# Patient Record
Sex: FEMALE | Race: WHITE | NOT HISPANIC OR LATINO
[De-identification: names, ages, dates, MRNs, and addresses within clinical notes are randomized per-mention and may not be internally consistent; named-entity substitution may affect disease eponyms.]

---

## 2019-07-10 ENCOUNTER — RESULT REVIEW (OUTPATIENT)
Age: 69
End: 2019-07-10

## 2021-07-08 ENCOUNTER — APPOINTMENT (OUTPATIENT)
Dept: OPHTHALMOLOGY | Facility: CLINIC | Age: 71
End: 2021-07-08

## 2021-07-21 ENCOUNTER — APPOINTMENT (OUTPATIENT)
Dept: OPHTHALMOLOGY | Facility: CLINIC | Age: 71
End: 2021-07-21

## 2021-09-02 ENCOUNTER — APPOINTMENT (OUTPATIENT)
Dept: OPHTHALMOLOGY | Facility: CLINIC | Age: 71
End: 2021-09-02
Payer: MEDICARE

## 2021-09-02 ENCOUNTER — NON-APPOINTMENT (OUTPATIENT)
Age: 71
End: 2021-09-02

## 2021-09-02 PROCEDURE — 92134 CPTRZ OPH DX IMG PST SGM RTA: CPT

## 2021-09-02 PROCEDURE — 92014 COMPRE OPH EXAM EST PT 1/>: CPT

## 2021-09-02 PROCEDURE — 92136 OPHTHALMIC BIOMETRY: CPT

## 2021-09-13 ENCOUNTER — APPOINTMENT (OUTPATIENT)
Dept: OPHTHALMOLOGY | Facility: HOSPITAL | Age: 71
End: 2021-09-13
Payer: MEDICARE

## 2021-09-13 ENCOUNTER — NON-APPOINTMENT (OUTPATIENT)
Age: 71
End: 2021-09-13

## 2021-09-13 PROCEDURE — 66984 XCAPSL CTRC RMVL W/O ECP: CPT | Mod: LT

## 2021-09-14 ENCOUNTER — APPOINTMENT (OUTPATIENT)
Dept: OPHTHALMOLOGY | Facility: CLINIC | Age: 71
End: 2021-09-14
Payer: MEDICARE

## 2021-09-14 ENCOUNTER — NON-APPOINTMENT (OUTPATIENT)
Age: 71
End: 2021-09-14

## 2021-09-14 PROCEDURE — 99024 POSTOP FOLLOW-UP VISIT: CPT

## 2021-09-21 ENCOUNTER — APPOINTMENT (OUTPATIENT)
Dept: OPHTHALMOLOGY | Facility: CLINIC | Age: 71
End: 2021-09-21

## 2021-09-23 ENCOUNTER — APPOINTMENT (OUTPATIENT)
Dept: OPHTHALMOLOGY | Facility: CLINIC | Age: 71
End: 2021-09-23
Payer: MEDICARE

## 2021-09-23 ENCOUNTER — NON-APPOINTMENT (OUTPATIENT)
Age: 71
End: 2021-09-23

## 2021-09-23 PROCEDURE — 99024 POSTOP FOLLOW-UP VISIT: CPT

## 2021-10-01 ENCOUNTER — NON-APPOINTMENT (OUTPATIENT)
Age: 71
End: 2021-10-01

## 2021-10-01 ENCOUNTER — APPOINTMENT (OUTPATIENT)
Dept: OPHTHALMOLOGY | Facility: CLINIC | Age: 71
End: 2021-10-01
Payer: MEDICARE

## 2021-10-01 PROCEDURE — 92012 INTRM OPH EXAM EST PATIENT: CPT | Mod: 24

## 2021-10-04 ENCOUNTER — NON-APPOINTMENT (OUTPATIENT)
Age: 71
End: 2021-10-04

## 2021-10-04 ENCOUNTER — APPOINTMENT (OUTPATIENT)
Dept: OPHTHALMOLOGY | Facility: CLINIC | Age: 71
End: 2021-10-04
Payer: MEDICARE

## 2021-10-04 PROCEDURE — 99024 POSTOP FOLLOW-UP VISIT: CPT

## 2021-11-11 ENCOUNTER — APPOINTMENT (OUTPATIENT)
Dept: OPHTHALMOLOGY | Facility: CLINIC | Age: 71
End: 2021-11-11
Payer: MEDICARE

## 2021-11-11 ENCOUNTER — TRANSCRIPTION ENCOUNTER (OUTPATIENT)
Age: 71
End: 2021-11-11

## 2021-11-11 ENCOUNTER — NON-APPOINTMENT (OUTPATIENT)
Age: 71
End: 2021-11-11

## 2021-11-11 PROCEDURE — 99024 POSTOP FOLLOW-UP VISIT: CPT

## 2021-11-16 PROBLEM — Z00.00 ENCOUNTER FOR PREVENTIVE HEALTH EXAMINATION: Status: ACTIVE | Noted: 2021-11-16

## 2021-11-24 ENCOUNTER — NON-APPOINTMENT (OUTPATIENT)
Age: 71
End: 2021-11-24

## 2021-11-29 ENCOUNTER — RESULT REVIEW (OUTPATIENT)
Age: 71
End: 2021-11-29

## 2021-11-29 RX ORDER — CHOLECALCIFEROL (VITAMIN D3) 25 MCG
TABLET ORAL
Refills: 0 | Status: ACTIVE | COMMUNITY

## 2021-11-29 RX ORDER — AMLODIPINE BESYLATE 5 MG/1
5 TABLET ORAL
Refills: 0 | Status: ACTIVE | COMMUNITY

## 2021-11-29 RX ORDER — BUPROPION HYDROCHLORIDE 100 MG/1
TABLET, FILM COATED ORAL
Refills: 0 | Status: ACTIVE | COMMUNITY

## 2021-11-29 RX ORDER — CETIRIZINE HCL 10 MG
10 TABLET ORAL
Refills: 0 | Status: ACTIVE | COMMUNITY

## 2021-11-29 RX ORDER — ALPRAZOLAM 2 MG/1
TABLET ORAL
Refills: 0 | Status: ACTIVE | COMMUNITY

## 2021-11-29 RX ORDER — ASPIRIN 81 MG
81 TABLET, DELAYED RELEASE (ENTERIC COATED) ORAL
Refills: 0 | Status: ACTIVE | COMMUNITY

## 2021-11-29 RX ORDER — OMEPRAZOLE MAGNESIUM 20 MG/1
20 CAPSULE, DELAYED RELEASE ORAL
Refills: 0 | Status: ACTIVE | COMMUNITY

## 2021-12-01 ENCOUNTER — NON-APPOINTMENT (OUTPATIENT)
Age: 71
End: 2021-12-01

## 2021-12-01 ENCOUNTER — APPOINTMENT (OUTPATIENT)
Dept: THORACIC SURGERY | Facility: CLINIC | Age: 71
End: 2021-12-01
Payer: MEDICARE

## 2021-12-01 DIAGNOSIS — Z86.79 PERSONAL HISTORY OF OTHER DISEASES OF THE CIRCULATORY SYSTEM: ICD-10-CM

## 2021-12-01 DIAGNOSIS — Z82.49 FAMILY HISTORY OF ISCHEMIC HEART DISEASE AND OTHER DISEASES OF THE CIRCULATORY SYSTEM: ICD-10-CM

## 2021-12-01 DIAGNOSIS — Z80.0 FAMILY HISTORY OF MALIGNANT NEOPLASM OF DIGESTIVE ORGANS: ICD-10-CM

## 2021-12-01 DIAGNOSIS — Z87.891 PERSONAL HISTORY OF NICOTINE DEPENDENCE: ICD-10-CM

## 2021-12-01 PROCEDURE — 99205 OFFICE O/P NEW HI 60 MIN: CPT

## 2021-12-02 VITALS
SYSTOLIC BLOOD PRESSURE: 147 MMHG | RESPIRATION RATE: 16 BRPM | TEMPERATURE: 98.2 F | OXYGEN SATURATION: 97 % | DIASTOLIC BLOOD PRESSURE: 75 MMHG | HEART RATE: 63 BPM

## 2021-12-02 PROBLEM — Z86.79 HISTORY OF HYPERTENSION: Status: RESOLVED | Noted: 2021-12-02 | Resolved: 2021-12-02

## 2021-12-02 PROBLEM — Z87.891 FORMER SMOKER: Status: ACTIVE | Noted: 2021-12-02

## 2021-12-02 PROBLEM — Z80.0 FAMILY HISTORY OF MALIGNANT NEOPLASM OF COLON: Status: ACTIVE | Noted: 2021-12-02

## 2021-12-02 PROBLEM — Z82.49 FAMILY HISTORY OF HYPERTENSION: Status: ACTIVE | Noted: 2021-12-02

## 2021-12-03 NOTE — CONSULT LETTER
[Dear  ___] : Dear  [unfilled], [Consult Letter:] : I had the pleasure of evaluating your patient, [unfilled]. [( Thank you for referring [unfilled] for consultation for _____ )] : Thank you for referring [unfilled] for consultation for [unfilled] [Please see my note below.] : Please see my note below. [Consult Closing:] : Thank you very much for allowing me to participate in the care of this patient.  If you have any questions, please do not hesitate to contact me. [Sincerely,] : Sincerely, [FreeTextEntry2] : Dr. Addison (PULM/ref)\par Dr. Jeremy Canchola (ONC)\par Dr. Yadiel Washburn (GI)\par Dr. Abhinav Jennings (CARDS)\par  [FreeTextEntry3] : Deon Galan MD, FACS \par , Thoracic Surgery, [facility]\par Chief of Division of Thoracic Surgery, [facility]\par Department of Cardiovascular & Thoracic Surgery \par , [facility] [employer] [facility]\par

## 2021-12-03 NOTE — ASSESSMENT
[FreeTextEntry1] : Ms. BAILEY SUN, 71 year old female, former smoker (quit 2 wks ago, 40PY, 1ppd), w/ hx of HTN and colon cancer s/p resection and chemo 2000, who presented for routine yearly lung cancer screening, found to have enlarging RML pulmonary nodule. She underwent FNA biopsy on 11/29 which revealed adenocarcinoma, favor lung primary.  \par \par I have independently reviewed the patient's medical records and diagnostic images at time of this office consultation. I discussed the imaging and biopsy results with patient and her spouse Dr. Sun. She has biopsy proven Adenocarcinoma of likely lung primary. I have recommended she undergo Bronchoscopy Right VATS Robotic Assisted Right Middle Lobectomy with LN dissection, Cardiac clearance and PFT prior. Risks, benefits, and alternatives explained to patient, all questions answered, patient agreed to proceed with surgery. She will be out of town until 12/17 and will call to book her surgery. \par \par I personally performed the services described in the documentation, reviewed the documentation recorded by the scribe in my presence and it accurately and completely records my words and actions.\par \par GABBI, SAUNDRA Lozada, am scribing for and in the presence of WASHINGTON Anderson, the following sections HISTORY OF PRESENT ILLNESS, PAST MEDICAL/FAMILY/SOCIAL HISTORY; REVIEW OF SYSTEMS; VITAL SIGNS; PHYSICAL EXAM; DISPOSITION.\par

## 2021-12-03 NOTE — PHYSICAL EXAM
[Restricted in physically strenuous activity but ambulatory and able to carry out work of a light or sedentary nature] : Status 1- Restricted in physically strenuous activity but ambulatory and able to carry out work of a light or sedentary nature, e.g., light house work, office work [General Appearance - Alert] : alert [General Appearance - In No Acute Distress] : in no acute distress [PERRL With Normal Accommodation] : pupils were equal in size, round, and reactive to light [Outer Ear] : the ears and nose were normal in appearance [Neck Appearance] : the appearance of the neck was normal [] : the neck was supple [Respiration, Rhythm And Depth] : normal respiratory rhythm and effort [Heart Sounds] : normal S1 and S2 [Examination Of The Chest] : the chest was normal in appearance [2+] : left 2+ [Breast Appearance] : normal in appearance [Abdomen Soft] : soft [Cervical Lymph Nodes Enlarged Posterior Bilaterally] : posterior cervical [Cervical Lymph Nodes Enlarged Anterior Bilaterally] : anterior cervical [Supraclavicular Lymph Nodes Enlarged Bilaterally] : supraclavicular [No CVA Tenderness] : no ~M costovertebral angle tenderness [Abnormal Walk] : normal gait [Skin Turgor] : normal skin turgor [No Focal Deficits] : no focal deficits [Oriented To Time, Place, And Person] : oriented to person, place, and time [FreeTextEntry1] : Deferred

## 2021-12-03 NOTE — HISTORY OF PRESENT ILLNESS
[FreeTextEntry1] : Ms. BAILEY SUN, 71 year old female, former smoker (quit 2 wks ago, 40PY, 1ppd), w/ hx of HTN and colon cancer s/p resection and chemo 2000, who presented for routine yearly lung cancer screening, found to have enlarging RML pulmonary nodule. She underwent FNA biopsy on 11/29 which revealed adenocarcinoma, favor lung primary.  \par \par CT Chest on 11/14/21:\par -  Enlarging 0.8 x 0.7 cm solid nodule noted in the medial segment of RML, previously 0.7 x 0.4 cm RML\par \par PET/CT on 11/18/21:\par - 1.2 cm spiculated RML nodule, SUV 2.2\par - Moderate focal hypermetabolism, SUV 4 corresponding to intraatrial septum, most c/w lipomatous hypertrophy septum. \par \par Patient is here today for CT Sx consultation, referred by Dr. Addison (PULM).

## 2021-12-28 DIAGNOSIS — Z01.818 ENCOUNTER FOR OTHER PREPROCEDURAL EXAMINATION: ICD-10-CM

## 2022-01-10 ENCOUNTER — OUTPATIENT (OUTPATIENT)
Dept: OUTPATIENT SERVICES | Facility: HOSPITAL | Age: 72
LOS: 1 days | End: 2022-01-10
Payer: MEDICARE

## 2022-01-10 VITALS
RESPIRATION RATE: 16 BRPM | OXYGEN SATURATION: 100 % | TEMPERATURE: 98 F | WEIGHT: 138.89 LBS | DIASTOLIC BLOOD PRESSURE: 54 MMHG | HEART RATE: 58 BPM | HEIGHT: 62 IN | SYSTOLIC BLOOD PRESSURE: 105 MMHG

## 2022-01-10 DIAGNOSIS — Z96.659 PRESENCE OF UNSPECIFIED ARTIFICIAL KNEE JOINT: Chronic | ICD-10-CM

## 2022-01-10 DIAGNOSIS — R91.1 SOLITARY PULMONARY NODULE: ICD-10-CM

## 2022-01-10 DIAGNOSIS — Z90.49 ACQUIRED ABSENCE OF OTHER SPECIFIED PARTS OF DIGESTIVE TRACT: Chronic | ICD-10-CM

## 2022-01-10 DIAGNOSIS — Z29.9 ENCOUNTER FOR PROPHYLACTIC MEASURES, UNSPECIFIED: ICD-10-CM

## 2022-01-10 DIAGNOSIS — Z98.890 OTHER SPECIFIED POSTPROCEDURAL STATES: Chronic | ICD-10-CM

## 2022-01-10 DIAGNOSIS — Z87.39 PERSONAL HISTORY OF OTHER DISEASES OF THE MUSCULOSKELETAL SYSTEM AND CONNECTIVE TISSUE: Chronic | ICD-10-CM

## 2022-01-10 DIAGNOSIS — Z01.818 ENCOUNTER FOR OTHER PREPROCEDURAL EXAMINATION: ICD-10-CM

## 2022-01-10 LAB
ANION GAP SERPL CALC-SCNC: 3 MMOL/L — LOW (ref 5–17)
APTT BLD: 32.2 SEC — SIGNIFICANT CHANGE UP (ref 27.5–35.5)
BASOPHILS # BLD AUTO: 0.06 K/UL — SIGNIFICANT CHANGE UP (ref 0–0.2)
BASOPHILS NFR BLD AUTO: 0.8 % — SIGNIFICANT CHANGE UP (ref 0–2)
BUN SERPL-MCNC: 14 MG/DL — SIGNIFICANT CHANGE UP (ref 7–23)
CALCIUM SERPL-MCNC: 9.2 MG/DL — SIGNIFICANT CHANGE UP (ref 8.5–10.1)
CHLORIDE SERPL-SCNC: 106 MMOL/L — SIGNIFICANT CHANGE UP (ref 96–108)
CO2 SERPL-SCNC: 29 MMOL/L — SIGNIFICANT CHANGE UP (ref 22–31)
CREAT SERPL-MCNC: 0.52 MG/DL — SIGNIFICANT CHANGE UP (ref 0.5–1.3)
EOSINOPHIL # BLD AUTO: 0.13 K/UL — SIGNIFICANT CHANGE UP (ref 0–0.5)
EOSINOPHIL NFR BLD AUTO: 1.6 % — SIGNIFICANT CHANGE UP (ref 0–6)
GLUCOSE SERPL-MCNC: 101 MG/DL — HIGH (ref 70–99)
HCT VFR BLD CALC: 36.9 % — SIGNIFICANT CHANGE UP (ref 34.5–45)
HGB BLD-MCNC: 12.3 G/DL — SIGNIFICANT CHANGE UP (ref 11.5–15.5)
IMM GRANULOCYTES NFR BLD AUTO: 0.4 % — SIGNIFICANT CHANGE UP (ref 0–1.5)
INR BLD: 1 RATIO — SIGNIFICANT CHANGE UP (ref 0.88–1.16)
LYMPHOCYTES # BLD AUTO: 2.36 K/UL — SIGNIFICANT CHANGE UP (ref 1–3.3)
LYMPHOCYTES # BLD AUTO: 29.7 % — SIGNIFICANT CHANGE UP (ref 13–44)
MCHC RBC-ENTMCNC: 29.6 PG — SIGNIFICANT CHANGE UP (ref 27–34)
MCHC RBC-ENTMCNC: 33.3 GM/DL — SIGNIFICANT CHANGE UP (ref 32–36)
MCV RBC AUTO: 88.9 FL — SIGNIFICANT CHANGE UP (ref 80–100)
MONOCYTES # BLD AUTO: 0.51 K/UL — SIGNIFICANT CHANGE UP (ref 0–0.9)
MONOCYTES NFR BLD AUTO: 6.4 % — SIGNIFICANT CHANGE UP (ref 2–14)
NEUTROPHILS # BLD AUTO: 4.86 K/UL — SIGNIFICANT CHANGE UP (ref 1.8–7.4)
NEUTROPHILS NFR BLD AUTO: 61.1 % — SIGNIFICANT CHANGE UP (ref 43–77)
PLATELET # BLD AUTO: 227 K/UL — SIGNIFICANT CHANGE UP (ref 150–400)
POTASSIUM SERPL-MCNC: 4 MMOL/L — SIGNIFICANT CHANGE UP (ref 3.5–5.3)
POTASSIUM SERPL-SCNC: 4 MMOL/L — SIGNIFICANT CHANGE UP (ref 3.5–5.3)
PROTHROM AB SERPL-ACNC: 11.6 SEC — SIGNIFICANT CHANGE UP (ref 10.6–13.6)
RBC # BLD: 4.15 M/UL — SIGNIFICANT CHANGE UP (ref 3.8–5.2)
RBC # FLD: 13.6 % — SIGNIFICANT CHANGE UP (ref 10.3–14.5)
SODIUM SERPL-SCNC: 138 MMOL/L — SIGNIFICANT CHANGE UP (ref 135–145)
WBC # BLD: 7.95 K/UL — SIGNIFICANT CHANGE UP (ref 3.8–10.5)
WBC # FLD AUTO: 7.95 K/UL — SIGNIFICANT CHANGE UP (ref 3.8–10.5)

## 2022-01-10 PROCEDURE — 86901 BLOOD TYPING SEROLOGIC RH(D): CPT

## 2022-01-10 PROCEDURE — G0463: CPT | Mod: 25

## 2022-01-10 PROCEDURE — 86920 COMPATIBILITY TEST SPIN: CPT

## 2022-01-10 PROCEDURE — 93010 ELECTROCARDIOGRAM REPORT: CPT

## 2022-01-10 PROCEDURE — 93005 ELECTROCARDIOGRAM TRACING: CPT

## 2022-01-10 PROCEDURE — 86900 BLOOD TYPING SEROLOGIC ABO: CPT

## 2022-01-10 PROCEDURE — 80048 BASIC METABOLIC PNL TOTAL CA: CPT

## 2022-01-10 PROCEDURE — 85025 COMPLETE CBC W/AUTO DIFF WBC: CPT

## 2022-01-10 PROCEDURE — 86850 RBC ANTIBODY SCREEN: CPT

## 2022-01-10 PROCEDURE — 85610 PROTHROMBIN TIME: CPT

## 2022-01-10 PROCEDURE — 85730 THROMBOPLASTIN TIME PARTIAL: CPT

## 2022-01-10 PROCEDURE — 36415 COLL VENOUS BLD VENIPUNCTURE: CPT

## 2022-01-10 NOTE — H&P PST ADULT - NSICDXPASTSURGICALHX_GEN_ALL_CORE_FT
PAST SURGICAL HISTORY:  H/O colonoscopy 2019    H/O total knee replacement Bilateral-- 1/2016 & 5/2016    History of colon resection 6/2000- colon cancer, removed 18 inches, chemo. 19 treatments    History of lumbar laminectomy Lumbar Lami. , fusion L4-L5--2014    History of trigger finger Bilateral--1990's

## 2022-01-10 NOTE — H&P PST ADULT - ASSESSMENT
71 y.o female scheduled for  71 y.o female scheduled for Bronchoscopy, Right Video Assisted Thoracic Surgery, Robotic-Assisted, Right Middle Lobectomy with Lymph Node Dissection   Plan  1. Stop all NSAIDS, herbal supplements and vitamins for 7 days.  2. NPO at midnight.  3. Take the following medications--Wellbutrin-- with small sips of water on the morning of your procedure/surgery.  4. Use EZ sponges as directed  5. Labs, EKG as per surgeon  6. PMD STEFANIE Addison & GRISELDA Jennings cardiology visit for optimization prior to surgery as per surgeon  7. COVID swab appt: 1/15/2022    CAPRINI SCORE    AGE RELATED RISK FACTORS                                                       MOBILITY RELATED FACTORS  [ ] Age 41-60 years                                            (1 Point)                  [ ] Bed rest                                                        (1 Point)  [x ] Age: 61-74 years                                           (2 Points)                [ ] Plaster cast                                                   (2 Points)  [ ] Age= 75 years                                              (3 Points)                 [ ] Bed bound for more than 72 hours                   (2 Points)    DISEASE RELATED RISK FACTORS                                               GENDER SPECIFIC FACTORS  [ ] Edema in the lower extremities                       (1 Point)                  [ ] Pregnancy                                                     (1 Point)  [ ] Varicose veins                                               (1 Point)                  [ ] Post-partum < 6 weeks                                   (1 Point)             [x ] BMI > 25 Kg/m2                                            (1 Point)                  [ ] Hormonal therapy  or oral contraception            (1 Point)                 [ ] Sepsis (in the previous month)                        (1 Point)                  [ ] History of pregnancy complications  [ ] Pneumonia or serious lung disease                                               [ ] Unexplained or recurrent                       (1 Point)           (in the previous month)                               (1 Point)  [ ] Abnormal pulmonary function test                     (1 Point)                 SURGERY RELATED RISK FACTORS  [ ] Acute myocardial infarction                              (1 Point)                 [ ]  Section                                            (1 Point)  [ ] Congestive heart failure (in the previous month)  (1 Point)                 [ ] Minor surgery                                                 (1 Point)   [ ] Inflammatory bowel disease                             (1 Point)                 [ ] Arthroscopic surgery                                        (2 Points)  [ ] Central venous access                                    (2 Points)                [x ] General surgery lasting more than 45 minutes   (2 Points)       [ ] Stroke (in the previous month)                          (5 Points)               [ ] Elective arthroplasty                                        (5 Points)                                                                                                                                               HEMATOLOGY RELATED FACTORS                                                 TRAUMA RELATED RISK FACTORS  [ ] Prior episodes of VTE                                     (3 Points)                 [ ] Fracture of the hip, pelvis, or leg                       (5 Points)  [ ] Positive family history for VTE                         (3 Points)                 [ ] Acute spinal cord injury (in the previous month)  (5 Points)  [ ] Prothrombin 82630 A                                      (3 Points)                 [ ] Paralysis  (less than 1 month)                          (5 Points)  [ ] Factor V Leiden                                             (3 Points)                 [ ] Multiple Trauma within 1 month                         (5 Points)  [ ] Lupus anticoagulants                                     (3 Points)                                                           [ ] Anticardiolipin antibodies                                (3 Points)                                                       [ ] High homocysteine in the blood                      (3 Points)                                             [ ] Other congenital or acquired thrombophilia       (3 Points)                                                [ ] Heparin induced thrombocytopenia                  (3 Points)                                          Total Score [       5  ]      The Caprini score indicates this patient is at risk for a VTE event (score 3-5).  Most surgical patients in this group would benefit from pharmacologic prophylaxis.  The surgical team will determine the balance between VTE risk and bleeding risk

## 2022-01-10 NOTE — H&P PST ADULT - HISTORY OF PRESENT ILLNESS
71 y.o  71 y.o WD, WN pleasant female presents to PST with hx of right middle lobe mass. Patient states she was a participant in a lung cancer screening program for years. She was a smoker for 50+ years. She reports last screening which involves a CT scan revealed a right middle lobe lung mass. Patient was referred to surgeon and states further diagnostics and biopsy revealed positive cancer. Her hx is significant for colon cancer 20 yrs ago. Patient now scheduled for Bronchoscopy, Right Video Assisted Thoracic Surgery, Robotic-Assisted, Right Middle Lobectomy with Lymph Node Dissection

## 2022-01-11 DIAGNOSIS — Z01.818 ENCOUNTER FOR OTHER PREPROCEDURAL EXAMINATION: ICD-10-CM

## 2022-01-11 DIAGNOSIS — R91.1 SOLITARY PULMONARY NODULE: ICD-10-CM

## 2022-01-14 RX ORDER — HYDROMORPHONE HYDROCHLORIDE 2 MG/ML
0.5 INJECTION INTRAMUSCULAR; INTRAVENOUS; SUBCUTANEOUS
Refills: 0 | Status: DISCONTINUED | OUTPATIENT
Start: 2022-01-18 | End: 2022-01-18

## 2022-01-14 RX ORDER — SODIUM CHLORIDE 9 MG/ML
1000 INJECTION, SOLUTION INTRAVENOUS
Refills: 0 | Status: DISCONTINUED | OUTPATIENT
Start: 2022-01-18 | End: 2022-01-18

## 2022-01-14 RX ORDER — ONDANSETRON 8 MG/1
4 TABLET, FILM COATED ORAL ONCE
Refills: 0 | Status: DISCONTINUED | OUTPATIENT
Start: 2022-01-18 | End: 2022-01-18

## 2022-01-16 LAB — SARS-COV-2 N GENE NPH QL NAA+PROBE: NOT DETECTED

## 2022-01-18 ENCOUNTER — RESULT REVIEW (OUTPATIENT)
Age: 72
End: 2022-01-18

## 2022-01-18 ENCOUNTER — INPATIENT (INPATIENT)
Facility: HOSPITAL | Age: 72
LOS: 0 days | Discharge: HOME | DRG: 168 | End: 2022-01-19
Attending: THORACIC SURGERY (CARDIOTHORACIC VASCULAR SURGERY) | Admitting: THORACIC SURGERY (CARDIOTHORACIC VASCULAR SURGERY)
Payer: MEDICARE

## 2022-01-18 ENCOUNTER — APPOINTMENT (OUTPATIENT)
Dept: THORACIC SURGERY | Facility: HOSPITAL | Age: 72
End: 2022-01-18

## 2022-01-18 VITALS
DIASTOLIC BLOOD PRESSURE: 64 MMHG | TEMPERATURE: 98 F | HEART RATE: 62 BPM | SYSTOLIC BLOOD PRESSURE: 140 MMHG | WEIGHT: 138.01 LBS | HEIGHT: 62 IN | OXYGEN SATURATION: 100 % | RESPIRATION RATE: 15 BRPM

## 2022-01-18 DIAGNOSIS — C34.2 MALIGNANT NEOPLASM OF MIDDLE LOBE, BRONCHUS OR LUNG: ICD-10-CM

## 2022-01-18 DIAGNOSIS — Z96.659 PRESENCE OF UNSPECIFIED ARTIFICIAL KNEE JOINT: Chronic | ICD-10-CM

## 2022-01-18 DIAGNOSIS — R91.1 SOLITARY PULMONARY NODULE: ICD-10-CM

## 2022-01-18 DIAGNOSIS — E78.5 HYPERLIPIDEMIA, UNSPECIFIED: ICD-10-CM

## 2022-01-18 DIAGNOSIS — F32.9 MAJOR DEPRESSIVE DISORDER, SINGLE EPISODE, UNSPECIFIED: ICD-10-CM

## 2022-01-18 DIAGNOSIS — J44.9 CHRONIC OBSTRUCTIVE PULMONARY DISEASE, UNSPECIFIED: ICD-10-CM

## 2022-01-18 DIAGNOSIS — Z98.890 OTHER SPECIFIED POSTPROCEDURAL STATES: Chronic | ICD-10-CM

## 2022-01-18 DIAGNOSIS — Z87.39 PERSONAL HISTORY OF OTHER DISEASES OF THE MUSCULOSKELETAL SYSTEM AND CONNECTIVE TISSUE: Chronic | ICD-10-CM

## 2022-01-18 DIAGNOSIS — Z90.49 ACQUIRED ABSENCE OF OTHER SPECIFIED PARTS OF DIGESTIVE TRACT: Chronic | ICD-10-CM

## 2022-01-18 DIAGNOSIS — F41.9 ANXIETY DISORDER, UNSPECIFIED: ICD-10-CM

## 2022-01-18 LAB
ANION GAP SERPL CALC-SCNC: 3 MMOL/L — LOW (ref 5–17)
BUN SERPL-MCNC: 17 MG/DL — SIGNIFICANT CHANGE UP (ref 7–23)
CALCIUM SERPL-MCNC: 8.9 MG/DL — SIGNIFICANT CHANGE UP (ref 8.5–10.1)
CHLORIDE SERPL-SCNC: 110 MMOL/L — HIGH (ref 96–108)
CO2 SERPL-SCNC: 29 MMOL/L — SIGNIFICANT CHANGE UP (ref 22–31)
CREAT SERPL-MCNC: 0.67 MG/DL — SIGNIFICANT CHANGE UP (ref 0.5–1.3)
GLUCOSE SERPL-MCNC: 136 MG/DL — HIGH (ref 70–99)
HCT VFR BLD CALC: 35.9 % — SIGNIFICANT CHANGE UP (ref 34.5–45)
HGB BLD-MCNC: 11.5 G/DL — SIGNIFICANT CHANGE UP (ref 11.5–15.5)
MAGNESIUM SERPL-MCNC: 2.2 MG/DL — SIGNIFICANT CHANGE UP (ref 1.6–2.6)
MCHC RBC-ENTMCNC: 29.9 PG — SIGNIFICANT CHANGE UP (ref 27–34)
MCHC RBC-ENTMCNC: 32 GM/DL — SIGNIFICANT CHANGE UP (ref 32–36)
MCV RBC AUTO: 93.5 FL — SIGNIFICANT CHANGE UP (ref 80–100)
PHOSPHATE SERPL-MCNC: 4.8 MG/DL — HIGH (ref 2.5–4.5)
PLATELET # BLD AUTO: 225 K/UL — SIGNIFICANT CHANGE UP (ref 150–400)
POTASSIUM SERPL-MCNC: 4.3 MMOL/L — SIGNIFICANT CHANGE UP (ref 3.5–5.3)
POTASSIUM SERPL-SCNC: 4.3 MMOL/L — SIGNIFICANT CHANGE UP (ref 3.5–5.3)
RBC # BLD: 3.84 M/UL — SIGNIFICANT CHANGE UP (ref 3.8–5.2)
RBC # FLD: 14.3 % — SIGNIFICANT CHANGE UP (ref 10.3–14.5)
SODIUM SERPL-SCNC: 142 MMOL/L — SIGNIFICANT CHANGE UP (ref 135–145)
WBC # BLD: 16.9 K/UL — HIGH (ref 3.8–10.5)
WBC # FLD AUTO: 16.9 K/UL — HIGH (ref 3.8–10.5)

## 2022-01-18 PROCEDURE — 88341 IMHCHEM/IMCYTCHM EA ADD ANTB: CPT

## 2022-01-18 PROCEDURE — 84100 ASSAY OF PHOSPHORUS: CPT

## 2022-01-18 PROCEDURE — 88309 TISSUE EXAM BY PATHOLOGIST: CPT

## 2022-01-18 PROCEDURE — C9399: CPT

## 2022-01-18 PROCEDURE — 80048 BASIC METABOLIC PNL TOTAL CA: CPT

## 2022-01-18 PROCEDURE — 94640 AIRWAY INHALATION TREATMENT: CPT

## 2022-01-18 PROCEDURE — 85027 COMPLETE CBC AUTOMATED: CPT

## 2022-01-18 PROCEDURE — 88305 TISSUE EXAM BY PATHOLOGIST: CPT

## 2022-01-18 PROCEDURE — 36415 COLL VENOUS BLD VENIPUNCTURE: CPT

## 2022-01-18 PROCEDURE — 83735 ASSAY OF MAGNESIUM: CPT

## 2022-01-18 PROCEDURE — 85025 COMPLETE CBC W/AUTO DIFF WBC: CPT

## 2022-01-18 PROCEDURE — 71045 X-RAY EXAM CHEST 1 VIEW: CPT

## 2022-01-18 PROCEDURE — 97161 PT EVAL LOW COMPLEX 20 MIN: CPT | Mod: GP

## 2022-01-18 PROCEDURE — 97116 GAIT TRAINING THERAPY: CPT | Mod: GP

## 2022-01-18 PROCEDURE — 88342 IMHCHEM/IMCYTCHM 1ST ANTB: CPT

## 2022-01-18 PROCEDURE — C1889: CPT

## 2022-01-18 RX ORDER — HYDROMORPHONE HYDROCHLORIDE 2 MG/ML
30 INJECTION INTRAMUSCULAR; INTRAVENOUS; SUBCUTANEOUS
Refills: 0 | Status: DISCONTINUED | OUTPATIENT
Start: 2022-01-18 | End: 2022-01-19

## 2022-01-18 RX ORDER — ONDANSETRON 8 MG/1
4 TABLET, FILM COATED ORAL EVERY 6 HOURS
Refills: 0 | Status: DISCONTINUED | OUTPATIENT
Start: 2022-01-18 | End: 2022-01-19

## 2022-01-18 RX ORDER — CETIRIZINE HYDROCHLORIDE 10 MG/1
1 TABLET ORAL
Qty: 0 | Refills: 0 | DISCHARGE

## 2022-01-18 RX ORDER — GABAPENTIN 400 MG/1
400 CAPSULE ORAL AT BEDTIME
Refills: 0 | Status: DISCONTINUED | OUTPATIENT
Start: 2022-01-18 | End: 2022-01-19

## 2022-01-18 RX ORDER — CETIRIZINE HYDROCHLORIDE 10 MG/1
10 TABLET ORAL DAILY
Refills: 0 | Status: DISCONTINUED | OUTPATIENT
Start: 2022-01-18 | End: 2022-01-19

## 2022-01-18 RX ORDER — ALBUTEROL 90 UG/1
2.5 AEROSOL, METERED ORAL EVERY 6 HOURS
Refills: 0 | Status: DISCONTINUED | OUTPATIENT
Start: 2022-01-18 | End: 2022-01-18

## 2022-01-18 RX ORDER — NALOXONE HYDROCHLORIDE 4 MG/.1ML
0.1 SPRAY NASAL
Refills: 0 | Status: DISCONTINUED | OUTPATIENT
Start: 2022-01-18 | End: 2022-01-19

## 2022-01-18 RX ORDER — ACETAMINOPHEN 500 MG
1000 TABLET ORAL ONCE
Refills: 0 | Status: COMPLETED | OUTPATIENT
Start: 2022-01-19 | End: 2022-01-19

## 2022-01-18 RX ORDER — HEPARIN SODIUM 5000 [USP'U]/ML
5000 INJECTION INTRAVENOUS; SUBCUTANEOUS EVERY 8 HOURS
Refills: 0 | Status: DISCONTINUED | OUTPATIENT
Start: 2022-01-18 | End: 2022-01-19

## 2022-01-18 RX ORDER — BUPROPION HYDROCHLORIDE 150 MG/1
1 TABLET, EXTENDED RELEASE ORAL
Qty: 0 | Refills: 0 | DISCHARGE

## 2022-01-18 RX ORDER — AMLODIPINE BESYLATE 2.5 MG/1
5 TABLET ORAL DAILY
Refills: 0 | Status: DISCONTINUED | OUTPATIENT
Start: 2022-01-18 | End: 2022-01-19

## 2022-01-18 RX ORDER — OMEPRAZOLE 10 MG/1
1 CAPSULE, DELAYED RELEASE ORAL
Qty: 0 | Refills: 0 | DISCHARGE

## 2022-01-18 RX ORDER — ACETAMINOPHEN 500 MG
1000 TABLET ORAL ONCE
Refills: 0 | Status: COMPLETED | OUTPATIENT
Start: 2022-01-18 | End: 2022-01-18

## 2022-01-18 RX ORDER — IPRATROPIUM BROMIDE 0.2 MG/ML
500 SOLUTION, NON-ORAL INHALATION EVERY 6 HOURS
Refills: 0 | Status: DISCONTINUED | OUTPATIENT
Start: 2022-01-18 | End: 2022-01-18

## 2022-01-18 RX ORDER — BUPROPION HYDROCHLORIDE 150 MG/1
150 TABLET, EXTENDED RELEASE ORAL AT BEDTIME
Refills: 0 | Status: DISCONTINUED | OUTPATIENT
Start: 2022-01-18 | End: 2022-01-19

## 2022-01-18 RX ORDER — ACETAMINOPHEN 500 MG
1000 TABLET ORAL EVERY 8 HOURS
Refills: 0 | Status: DISCONTINUED | OUTPATIENT
Start: 2022-01-19 | End: 2022-01-19

## 2022-01-18 RX ORDER — POLYETHYLENE GLYCOL 3350 17 G/17G
17 POWDER, FOR SOLUTION ORAL DAILY
Refills: 0 | Status: DISCONTINUED | OUTPATIENT
Start: 2022-01-18 | End: 2022-01-19

## 2022-01-18 RX ORDER — SENNA PLUS 8.6 MG/1
2 TABLET ORAL AT BEDTIME
Refills: 0 | Status: DISCONTINUED | OUTPATIENT
Start: 2022-01-18 | End: 2022-01-19

## 2022-01-18 RX ORDER — PANTOPRAZOLE SODIUM 20 MG/1
40 TABLET, DELAYED RELEASE ORAL
Refills: 0 | Status: DISCONTINUED | OUTPATIENT
Start: 2022-01-18 | End: 2022-01-19

## 2022-01-18 RX ORDER — ALPRAZOLAM 0.25 MG
0 TABLET ORAL
Qty: 0 | Refills: 0 | DISCHARGE

## 2022-01-18 RX ORDER — HYDROMORPHONE HYDROCHLORIDE 2 MG/ML
0.5 INJECTION INTRAMUSCULAR; INTRAVENOUS; SUBCUTANEOUS
Refills: 0 | Status: DISCONTINUED | OUTPATIENT
Start: 2022-01-18 | End: 2022-01-19

## 2022-01-18 RX ORDER — BUDESONIDE AND FORMOTEROL FUMARATE DIHYDRATE 160; 4.5 UG/1; UG/1
2 AEROSOL RESPIRATORY (INHALATION) EVERY 24 HOURS
Refills: 0 | Status: DISCONTINUED | OUTPATIENT
Start: 2022-01-18 | End: 2022-01-19

## 2022-01-18 RX ORDER — GABAPENTIN 400 MG/1
0 CAPSULE ORAL
Qty: 0 | Refills: 0 | DISCHARGE

## 2022-01-18 RX ORDER — SODIUM CHLORIDE 9 MG/ML
1000 INJECTION, SOLUTION INTRAVENOUS
Refills: 0 | Status: DISCONTINUED | OUTPATIENT
Start: 2022-01-18 | End: 2022-01-19

## 2022-01-18 RX ORDER — FLUTICASONE PROPIONATE AND SALMETEROL 50; 250 UG/1; UG/1
1 POWDER ORAL; RESPIRATORY (INHALATION)
Qty: 0 | Refills: 0 | DISCHARGE

## 2022-01-18 RX ORDER — AMLODIPINE BESYLATE 2.5 MG/1
1 TABLET ORAL
Qty: 0 | Refills: 0 | DISCHARGE

## 2022-01-18 RX ADMIN — SODIUM CHLORIDE 25 MILLILITER(S): 9 INJECTION, SOLUTION INTRAVENOUS at 12:59

## 2022-01-18 RX ADMIN — Medication 400 MILLIGRAM(S): at 17:23

## 2022-01-18 RX ADMIN — Medication 1000 MILLIGRAM(S): at 17:41

## 2022-01-18 RX ADMIN — HEPARIN SODIUM 5000 UNIT(S): 5000 INJECTION INTRAVENOUS; SUBCUTANEOUS at 21:09

## 2022-01-18 RX ADMIN — SENNA PLUS 2 TABLET(S): 8.6 TABLET ORAL at 21:09

## 2022-01-18 RX ADMIN — GABAPENTIN 400 MILLIGRAM(S): 400 CAPSULE ORAL at 21:09

## 2022-01-18 RX ADMIN — HYDROMORPHONE HYDROCHLORIDE 30 MILLILITER(S): 2 INJECTION INTRAMUSCULAR; INTRAVENOUS; SUBCUTANEOUS at 12:32
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
## 2022-01-18 NOTE — PATIENT PROFILE ADULT - FALL HARM RISK - UNIVERSAL INTERVENTIONS
Bed in lowest position, wheels locked, appropriate side rails in place/Call bell, personal items and telephone in reach/Instruct patient to call for assistance before getting out of bed or chair/Non-slip footwear when patient is out of bed/[place] to call system/Physically safe environment - no spills, clutter or unnecessary equipment/Purposeful Proactive Rounding/Room/bathroom lighting operational, light cord in reach

## 2022-01-18 NOTE — BRIEF OPERATIVE NOTE - NSICDXBRIEFPROCEDURE_GEN_ALL_CORE_FT
PROCEDURES:  Robot-assisted thoracoscopy 18-Jan-2022 13:04:04 Right rootic VATS w/ RMLobectomy, MLND Shemar Thomas  Robotic assisted procedure, thoracoscopic 18-Jan-2022 13:04:24  Shemar Thomas

## 2022-01-19 ENCOUNTER — TRANSCRIPTION ENCOUNTER (OUTPATIENT)
Age: 72
End: 2022-01-19

## 2022-01-19 VITALS — TEMPERATURE: 98 F

## 2022-01-19 LAB
ANION GAP SERPL CALC-SCNC: 6 MMOL/L — SIGNIFICANT CHANGE UP (ref 5–17)
BASOPHILS # BLD AUTO: 0.02 K/UL — SIGNIFICANT CHANGE UP (ref 0–0.2)
BASOPHILS NFR BLD AUTO: 0.1 % — SIGNIFICANT CHANGE UP (ref 0–2)
BUN SERPL-MCNC: 15 MG/DL — SIGNIFICANT CHANGE UP (ref 7–23)
CALCIUM SERPL-MCNC: 8.8 MG/DL — SIGNIFICANT CHANGE UP (ref 8.5–10.1)
CHLORIDE SERPL-SCNC: 108 MMOL/L — SIGNIFICANT CHANGE UP (ref 96–108)
CO2 SERPL-SCNC: 25 MMOL/L — SIGNIFICANT CHANGE UP (ref 22–31)
CREAT SERPL-MCNC: 0.49 MG/DL — LOW (ref 0.5–1.3)
EOSINOPHIL # BLD AUTO: 0 K/UL — SIGNIFICANT CHANGE UP (ref 0–0.5)
EOSINOPHIL NFR BLD AUTO: 0 % — SIGNIFICANT CHANGE UP (ref 0–6)
GLUCOSE SERPL-MCNC: 149 MG/DL — HIGH (ref 70–99)
HCT VFR BLD CALC: 33.1 % — LOW (ref 34.5–45)
HGB BLD-MCNC: 10.8 G/DL — LOW (ref 11.5–15.5)
IMM GRANULOCYTES NFR BLD AUTO: 0.7 % — SIGNIFICANT CHANGE UP (ref 0–1.5)
LYMPHOCYTES # BLD AUTO: 1.26 K/UL — SIGNIFICANT CHANGE UP (ref 1–3.3)
LYMPHOCYTES # BLD AUTO: 8.1 % — LOW (ref 13–44)
MCHC RBC-ENTMCNC: 30.1 PG — SIGNIFICANT CHANGE UP (ref 27–34)
MCHC RBC-ENTMCNC: 32.6 GM/DL — SIGNIFICANT CHANGE UP (ref 32–36)
MCV RBC AUTO: 92.2 FL — SIGNIFICANT CHANGE UP (ref 80–100)
MONOCYTES # BLD AUTO: 1.22 K/UL — HIGH (ref 0–0.9)
MONOCYTES NFR BLD AUTO: 7.8 % — SIGNIFICANT CHANGE UP (ref 2–14)
NEUTROPHILS # BLD AUTO: 12.98 K/UL — HIGH (ref 1.8–7.4)
NEUTROPHILS NFR BLD AUTO: 83.3 % — HIGH (ref 43–77)
PLATELET # BLD AUTO: 204 K/UL — SIGNIFICANT CHANGE UP (ref 150–400)
POTASSIUM SERPL-MCNC: 4 MMOL/L — SIGNIFICANT CHANGE UP (ref 3.5–5.3)
POTASSIUM SERPL-SCNC: 4 MMOL/L — SIGNIFICANT CHANGE UP (ref 3.5–5.3)
RBC # BLD: 3.59 M/UL — LOW (ref 3.8–5.2)
RBC # FLD: 13.9 % — SIGNIFICANT CHANGE UP (ref 10.3–14.5)
SODIUM SERPL-SCNC: 139 MMOL/L — SIGNIFICANT CHANGE UP (ref 135–145)
WBC # BLD: 15.59 K/UL — HIGH (ref 3.8–10.5)
WBC # FLD AUTO: 15.59 K/UL — HIGH (ref 3.8–10.5)

## 2022-01-19 RX ORDER — OXYCODONE HYDROCHLORIDE 5 MG/1
10 TABLET ORAL EVERY 6 HOURS
Refills: 0 | Status: DISCONTINUED | OUTPATIENT
Start: 2022-01-19 | End: 2022-01-19

## 2022-01-19 RX ORDER — CHOLECALCIFEROL (VITAMIN D3) 125 MCG
1 CAPSULE ORAL
Qty: 0 | Refills: 0 | DISCHARGE

## 2022-01-19 RX ORDER — ACETAMINOPHEN 500 MG
2 TABLET ORAL
Qty: 30 | Refills: 0
Start: 2022-01-19 | End: 2022-01-23

## 2022-01-19 RX ORDER — POLYETHYLENE GLYCOL 3350 17 G/17G
17 POWDER, FOR SOLUTION ORAL
Qty: 0 | Refills: 0 | DISCHARGE
Start: 2022-01-19

## 2022-01-19 RX ORDER — OXYCODONE HYDROCHLORIDE 5 MG/1
1 TABLET ORAL
Qty: 20 | Refills: 0
Start: 2022-01-19 | End: 2022-01-23

## 2022-01-19 RX ORDER — SENNA PLUS 8.6 MG/1
2 TABLET ORAL
Qty: 0 | Refills: 0 | DISCHARGE
Start: 2022-01-19

## 2022-01-19 RX ORDER — OXYCODONE HYDROCHLORIDE 5 MG/1
5 TABLET ORAL EVERY 6 HOURS
Refills: 0 | Status: DISCONTINUED | OUTPATIENT
Start: 2022-01-19 | End: 2022-01-19

## 2022-01-19 RX ADMIN — AMLODIPINE BESYLATE 5 MILLIGRAM(S): 2.5 TABLET ORAL at 09:28

## 2022-01-19 RX ADMIN — OXYCODONE HYDROCHLORIDE 5 MILLIGRAM(S): 5 TABLET ORAL at 12:20

## 2022-01-19 RX ADMIN — Medication 400 MILLIGRAM(S): at 01:22

## 2022-01-19 RX ADMIN — Medication 1000 MILLIGRAM(S): at 09:29

## 2022-01-19 RX ADMIN — HEPARIN SODIUM 5000 UNIT(S): 5000 INJECTION INTRAVENOUS; SUBCUTANEOUS at 05:00

## 2022-01-19 RX ADMIN — Medication 1000 MILLIGRAM(S): at 10:24

## 2022-01-19 RX ADMIN — CETIRIZINE HYDROCHLORIDE 10 MILLIGRAM(S): 10 TABLET ORAL at 11:32

## 2022-01-19 RX ADMIN — Medication 1000 MILLIGRAM(S): at 01:41

## 2022-01-19 RX ADMIN — PANTOPRAZOLE SODIUM 40 MILLIGRAM(S): 20 TABLET, DELAYED RELEASE ORAL at 07:50

## 2022-01-19 RX ADMIN — BUDESONIDE AND FORMOTEROL FUMARATE DIHYDRATE 2 PUFF(S): 160; 4.5 AEROSOL RESPIRATORY (INHALATION) at 08:56

## 2022-01-19 RX ADMIN — OXYCODONE HYDROCHLORIDE 5 MILLIGRAM(S): 5 TABLET ORAL at 11:30

## 2022-01-19 RX ADMIN — BUPROPION HYDROCHLORIDE 150 MILLIGRAM(S): 150 TABLET, EXTENDED RELEASE ORAL at 09:29

## 2022-01-19 RX ADMIN — HEPARIN SODIUM 5000 UNIT(S): 5000 INJECTION INTRAVENOUS; SUBCUTANEOUS at 14:59

## 2022-01-19 NOTE — DISCHARGE NOTE PROVIDER - NSDCFUADDAPPT_GEN_ALL_CORE_FT
Leave dressing intact until tomorrow.  At that time you may remove the dressing and take a shower. Place clean gauze over wound if continual drainage. Call Dr. Galan's office at [phone]  tomorrow or the next business day to make a followup appointment. Call the office if you experience any fevers, shortness of breath, chest pain or excessive drainage from the incision, day or night. Go to the emergency room if any of these symptoms are severe. Take your medications as ordered and take a stool softener if needed with the narcotic medications.

## 2022-01-19 NOTE — DISCHARGE NOTE NURSING/CASE MANAGEMENT/SOCIAL WORK - NSDCPEFALRISK_GEN_ALL_CORE
For information on Fall & Injury Prevention, visit: [email or web address] OR  [email or web address] OR  https://www.cdc.gov/steadi/patient.html

## 2022-01-19 NOTE — DISCHARGE NOTE NURSING/CASE MANAGEMENT/SOCIAL WORK - NSDCFUADDAPPT_GEN_ALL_CORE_FT
Leave dressing intact until tomorrow.  At that time you may remove the dressing and take a shower. Place clean gauze over wound if continual drainage. Call Dr. Galan's office at [phone]  tomorrow or the next business day to make a followup appointment. Call the office if you experience any fevers, shortness of breath, chest pain or excessive drainage from the incision, day or night. Go to the emergency room if any of these symptoms are severe. Take your medications as ordered and take a stool softener if needed with the narcotic medications.

## 2022-01-19 NOTE — PHYSICAL THERAPY INITIAL EVALUATION ADULT - DISCHARGE DISPOSITION, PT EVAL
Pt amb about 200' independently w/o any AD, steady on feet, mild pain at surgical site, does feel little sob after walk, o2 sats of 95%, pt does not require acute care PT, DC from PT services, NO DME required/home/home w/ assist/no skilled PT needs
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
## 2022-01-19 NOTE — DISCHARGE NOTE PROVIDER - CARE PROVIDER_API CALL
Deon Galan)  Thoracic Surgery  [address] [place] [address]  Phone: [phone]  Fax: [phone]  Follow Up Time:

## 2022-01-19 NOTE — DISCHARGE NOTE NURSING/CASE MANAGEMENT/SOCIAL WORK - PATIENT PORTAL LINK FT
You can access the FollowMyHealth Patient Portal offered by [facility] by registering at the following website: [email or web address]. By joining [facility] [employer]’s FollowMyHealth portal, you will also be able to view your health information using other applications (apps) compatible with our system.

## 2022-01-19 NOTE — PHYSICAL THERAPY INITIAL EVALUATION ADULT - PERTINENT HX OF CURRENT PROBLEM, REHAB EVAL
Patient is a 71y old  Female who presents with a chief complaint of lung nodule, S/P  FB Right rootic VATS w/ RML Lobectomy,

## 2022-01-19 NOTE — PHYSICAL THERAPY INITIAL EVALUATION ADULT - GENERAL OBSERVATIONS, REHAB EVAL
Pt was found sitting in chair in SDU with tele,BP cuff, pulse oxy, Pt is pleasant and willing to participate in PT, medicated for pain at surgical site

## 2022-01-19 NOTE — DISCHARGE NOTE PROVIDER - HOSPITAL COURSE
Patient is a 71y old  Female who presents with a chief complaint of lung nodule    Pt is S/P  FB, Right robotic VATS w/ RMLobectomy, MLND 1/18

## 2022-01-19 NOTE — DISCHARGE NOTE PROVIDER - NSDCMRMEDTOKEN_GEN_ALL_CORE_FT
acetaminophen 500 mg oral tablet: 2 tab(s) orally every 8 hours PRN pain   Advair Diskus 250 mcg-50 mcg inhalation powder: 1 puff(s) inhaled once a day  ALPRAZolam 0.5 mg oral tablet: orally once a day (at bedtime), As Needed  amLODIPine 5 mg oral tablet: 1 tab(s) orally once a day  gabapentin 400 mg oral tablet: orally once a day (at bedtime)  omeprazole 20 mg oral delayed release tablet: 1 tab(s) orally once a day, As Needed  oxyCODONE 10 mg oral tablet: 1 tab(s) orally every 6 hours, As needed, Severe Pain (7 - 10) MDD:4  oxyCODONE 5 mg oral tablet: 1 tab(s) orally every 6 hours, As needed, Moderate Pain (4 - 6) MDD:4  polyethylene glycol 3350 oral powder for reconstitution: 17 gram(s) orally once a day, As needed, Constipation  senna oral tablet: 2 tab(s) orally once a day (at bedtime)  Wellbutrin  mg/24 hours oral tablet, extended release: 1 tab(s) orally every 24 hours  ZyrTEC 10 mg oral tablet: 1 tab(s) orally once a day

## 2022-01-20 PROBLEM — M54.50 LOW BACK PAIN, UNSPECIFIED: Chronic | Status: ACTIVE | Noted: 2022-01-10

## 2022-01-20 PROBLEM — I38 ENDOCARDITIS, VALVE UNSPECIFIED: Chronic | Status: ACTIVE | Noted: 2022-01-10

## 2022-01-20 PROBLEM — M43.10 SPONDYLOLISTHESIS, SITE UNSPECIFIED: Chronic | Status: ACTIVE | Noted: 2022-01-10

## 2022-01-20 PROBLEM — C18.9 MALIGNANT NEOPLASM OF COLON, UNSPECIFIED: Chronic | Status: ACTIVE | Noted: 2022-01-10

## 2022-01-20 PROBLEM — Z87.09 PERSONAL HISTORY OF OTHER DISEASES OF THE RESPIRATORY SYSTEM: Chronic | Status: ACTIVE | Noted: 2022-01-10

## 2022-01-20 PROBLEM — M19.90 UNSPECIFIED OSTEOARTHRITIS, UNSPECIFIED SITE: Chronic | Status: ACTIVE | Noted: 2022-01-10
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
## 2022-01-20 PROBLEM — Z87.39 PERSONAL HISTORY OF OTHER DISEASES OF THE MUSCULOSKELETAL SYSTEM AND CONNECTIVE TISSUE: Chronic | Status: ACTIVE | Noted: 2022-01-10

## 2022-01-20 PROBLEM — I10 ESSENTIAL (PRIMARY) HYPERTENSION: Chronic | Status: ACTIVE | Noted: 2022-01-10

## 2022-01-20 PROBLEM — G62.9 POLYNEUROPATHY, UNSPECIFIED: Chronic | Status: ACTIVE | Noted: 2022-01-10

## 2022-01-20 PROBLEM — C34.90 MALIGNANT NEOPLASM OF UNSPECIFIED PART OF UNSPECIFIED BRONCHUS OR LUNG: Chronic | Status: ACTIVE | Noted: 2022-01-10

## 2022-01-20 PROBLEM — C44.91 BASAL CELL CARCINOMA OF SKIN, UNSPECIFIED: Chronic | Status: ACTIVE | Noted: 2022-01-10

## 2022-01-20 PROBLEM — F41.9 ANXIETY DISORDER, UNSPECIFIED: Chronic | Status: ACTIVE | Noted: 2022-01-10

## 2022-01-20 PROBLEM — K21.9 GASTRO-ESOPHAGEAL REFLUX DISEASE WITHOUT ESOPHAGITIS: Chronic | Status: ACTIVE | Noted: 2022-01-10

## 2022-01-25 DIAGNOSIS — I10 ESSENTIAL (PRIMARY) HYPERTENSION: ICD-10-CM

## 2022-01-25 DIAGNOSIS — Z85.038 PERSONAL HISTORY OF OTHER MALIGNANT NEOPLASM OF LARGE INTESTINE: ICD-10-CM

## 2022-01-25 DIAGNOSIS — Z87.891 PERSONAL HISTORY OF NICOTINE DEPENDENCE: ICD-10-CM

## 2022-01-25 DIAGNOSIS — G62.9 POLYNEUROPATHY, UNSPECIFIED: ICD-10-CM

## 2022-01-25 DIAGNOSIS — Z79.51 LONG TERM (CURRENT) USE OF INHALED STEROIDS: ICD-10-CM

## 2022-01-25 DIAGNOSIS — C34.2 MALIGNANT NEOPLASM OF MIDDLE LOBE, BRONCHUS OR LUNG: ICD-10-CM

## 2022-01-25 DIAGNOSIS — Z80.0 FAMILY HISTORY OF MALIGNANT NEOPLASM OF DIGESTIVE ORGANS: ICD-10-CM

## 2022-01-25 DIAGNOSIS — K21.9 GASTRO-ESOPHAGEAL REFLUX DISEASE WITHOUT ESOPHAGITIS: ICD-10-CM

## 2022-01-25 DIAGNOSIS — F41.8 OTHER SPECIFIED ANXIETY DISORDERS: ICD-10-CM

## 2022-01-26 ENCOUNTER — NON-APPOINTMENT (OUTPATIENT)
Age: 72
End: 2022-01-26

## 2022-01-26 ENCOUNTER — APPOINTMENT (OUTPATIENT)
Dept: THORACIC SURGERY | Facility: CLINIC | Age: 72
End: 2022-01-26
Payer: MEDICARE

## 2022-01-26 VITALS
WEIGHT: 136 LBS | SYSTOLIC BLOOD PRESSURE: 143 MMHG | HEART RATE: 71 BPM | DIASTOLIC BLOOD PRESSURE: 80 MMHG | OXYGEN SATURATION: 96 % | HEIGHT: 60 IN | BODY MASS INDEX: 26.7 KG/M2 | RESPIRATION RATE: 17 BRPM

## 2022-01-26 PROCEDURE — 99024 POSTOP FOLLOW-UP VISIT: CPT

## 2022-02-02 ENCOUNTER — APPOINTMENT (OUTPATIENT)
Dept: THORACIC SURGERY | Facility: CLINIC | Age: 72
End: 2022-02-02
Payer: MEDICARE

## 2022-02-02 PROCEDURE — 99024 POSTOP FOLLOW-UP VISIT: CPT

## 2022-04-13 ENCOUNTER — OUTPATIENT (OUTPATIENT)
Dept: OUTPATIENT SERVICES | Facility: HOSPITAL | Age: 72
LOS: 1 days | End: 2022-04-13
Payer: MEDICARE

## 2022-04-13 ENCOUNTER — APPOINTMENT (OUTPATIENT)
Dept: CT IMAGING | Facility: CLINIC | Age: 72
End: 2022-04-13
Payer: MEDICARE

## 2022-04-13 DIAGNOSIS — Z90.49 ACQUIRED ABSENCE OF OTHER SPECIFIED PARTS OF DIGESTIVE TRACT: Chronic | ICD-10-CM

## 2022-04-13 DIAGNOSIS — Z96.659 PRESENCE OF UNSPECIFIED ARTIFICIAL KNEE JOINT: Chronic | ICD-10-CM

## 2022-04-13 DIAGNOSIS — C34.90 MALIGNANT NEOPLASM OF UNSPECIFIED PART OF UNSPECIFIED BRONCHUS OR LUNG: ICD-10-CM

## 2022-04-13 DIAGNOSIS — Z98.890 OTHER SPECIFIED POSTPROCEDURAL STATES: Chronic | ICD-10-CM

## 2022-04-13 DIAGNOSIS — Z87.39 PERSONAL HISTORY OF OTHER DISEASES OF THE MUSCULOSKELETAL SYSTEM AND CONNECTIVE TISSUE: Chronic | ICD-10-CM

## 2022-04-13 PROCEDURE — G1004: CPT

## 2022-04-13 PROCEDURE — 71250 CT THORAX DX C-: CPT | Mod: MG

## 2022-04-13 PROCEDURE — 71250 CT THORAX DX C-: CPT | Mod: 26,MG

## 2022-04-20 ENCOUNTER — APPOINTMENT (OUTPATIENT)
Dept: THORACIC SURGERY | Facility: CLINIC | Age: 72
End: 2022-04-20
Payer: MEDICARE

## 2022-04-20 VITALS
DIASTOLIC BLOOD PRESSURE: 73 MMHG | SYSTOLIC BLOOD PRESSURE: 128 MMHG | RESPIRATION RATE: 16 BRPM | HEIGHT: 63 IN | HEART RATE: 86 BPM | WEIGHT: 134 LBS | BODY MASS INDEX: 23.74 KG/M2 | OXYGEN SATURATION: 98 %

## 2022-04-20 PROCEDURE — 99214 OFFICE O/P EST MOD 30 MIN: CPT

## 2022-04-22 NOTE — CONSULT LETTER
[Dear  ___] : Dear  [unfilled], [Courtesy Letter:] : I had the pleasure of seeing your patient, [unfilled], in my office today. [Sincerely,] : Sincerely, [FreeTextEntry2] : Dr. Addison (PULM/ref)\par Dr. Jeremy Canchola (ONC)\par Dr. Yadiel Washburn (GI)\par Dr. Abhinav Jennings (CARDS)\par   [FreeTextEntry3] : Deon Galan MD, FACS \par Vice Chairperson, Thoracic Surgery, [facility] Department of Cardiovascular & Thoracic Surgery \par , [facility] [employer] at [facility]

## 2022-04-22 NOTE — HISTORY OF PRESENT ILLNESS
[FreeTextEntry1] : Ms. BAILEY SUN, 72 year old female, former smoker (quit 2 wks ago, 40PY, 1ppd), w/ hx of HTN and colon cancer s/p resection and chemo 2000, who presented for routine yearly lung cancer screening, found to have enlarging RML pulmonary nodule. She underwent FNA biopsy on 11/29 which revealed adenocarcinoma, favor lung primary. \par \par Now s/p Bronchoscopy, Right VATS Robotic Assisted, Right Middle Lobectomy with hilar and mediastinal lymph node dissection on 1/18/22. Path of RML lobectomy reveals Invasive Acinar  Adenocarcinoma with small focal lipidic component, pT1aN0, 1 cm, G3, All margins negative, All LN negative (0/9). Additional findings, microscopic tumorlet, Negative staining for PDL-1. \par \par CT Chest on 4/13/22:\par - Focal area of nodular thickening along the suture line at the 1.7 cm is indeterminate. Differential includes loculated fluid or recurrence, although this measures greater than simple fluid attenuation. Consider follow-up in one month for assessing change.\par \par Patient is here today for 3 month follow up post surgery. Endorsing Right abdominal swelling/pain consistent with muscular pain likely due weakening of the rectus abdominous.

## 2022-04-22 NOTE — ASSESSMENT
[FreeTextEntry1] : Ms. BAILEY SUN, 72 year old female, former smoker (quit 2 wks ago, 40PY, 1ppd), w/ hx of colon cancer. She underwent FNA biopsy of RML nodule on 11/29 which revealed adenocarcinoma, favor lung primary. She is now s/p Right VATS Robotic Assisted, Right Middle Lobectomy with hilar and mediastinal lymph node dissection on 1/18/22. Path of RML lobectomy reveals Invasive Acinar  Adenocarcinoma with small focal lipidic component, pT1aN0, 1 cm, G3, All margins negative, All LN negative (0/9). Additional findings, microscopic tumorlet, Negative staining for PDL-1. \par \par CT chest reviewed and explained to patient; focal area of nodular thickening along suture line, likely loculated fluid and NOT recurrence. I recommended patient to return to office in 3 months with CT Chest without contrast.\par \par I personally performed the services described in the documentation, reviewed the documentation recorded by the scribe in my presence and it accurately and completely records my words and actions.\par \par GABBI, SAUNDRA Lozada, am scribing for and in the presence of WASHINGTON Anderson, the following sections HISTORY OF PRESENT ILLNESS, PAST MEDICAL/FAMILY/SOCIAL HISTORY; REVIEW OF SYSTEMS; VITAL SIGNS; PHYSICAL EXAM; DISPOSITION.\par

## 2022-07-11 ENCOUNTER — APPOINTMENT (OUTPATIENT)
Dept: CT IMAGING | Facility: CLINIC | Age: 72
End: 2022-07-11

## 2022-07-11 ENCOUNTER — OUTPATIENT (OUTPATIENT)
Dept: OUTPATIENT SERVICES | Facility: HOSPITAL | Age: 72
LOS: 1 days | End: 2022-07-11
Payer: MEDICARE

## 2022-07-11 DIAGNOSIS — Z87.39 PERSONAL HISTORY OF OTHER DISEASES OF THE MUSCULOSKELETAL SYSTEM AND CONNECTIVE TISSUE: Chronic | ICD-10-CM

## 2022-07-11 DIAGNOSIS — C34.90 MALIGNANT NEOPLASM OF UNSPECIFIED PART OF UNSPECIFIED BRONCHUS OR LUNG: ICD-10-CM

## 2022-07-11 DIAGNOSIS — Z98.890 OTHER SPECIFIED POSTPROCEDURAL STATES: Chronic | ICD-10-CM

## 2022-07-11 DIAGNOSIS — Z90.49 ACQUIRED ABSENCE OF OTHER SPECIFIED PARTS OF DIGESTIVE TRACT: Chronic | ICD-10-CM

## 2022-07-11 DIAGNOSIS — Z96.659 PRESENCE OF UNSPECIFIED ARTIFICIAL KNEE JOINT: Chronic | ICD-10-CM

## 2022-07-11 PROCEDURE — 71250 CT THORAX DX C-: CPT | Mod: 26,MG

## 2022-07-11 PROCEDURE — G1004: CPT

## 2022-07-11 PROCEDURE — 71250 CT THORAX DX C-: CPT | Mod: MG

## 2022-07-20 ENCOUNTER — APPOINTMENT (OUTPATIENT)
Dept: THORACIC SURGERY | Facility: CLINIC | Age: 72
End: 2022-07-20

## 2022-07-20 PROCEDURE — 99443: CPT | Mod: 95

## 2022-07-25 NOTE — CONSULT LETTER
[FreeTextEntry2] : Dr. Addison (PULM/ref)\par Dr. Jeremy Canchola (ONC)\par Dr. Yadiel Washburn (GI)\par Dr. Abhinav Jennings (CARDS)\par   [FreeTextEntry3] : Deon Galan MD, FACS \par Vice Chairperson, Thoracic Surgery, [facility] Department of Cardiovascular & Thoracic Surgery \par , [facility] [employer] at [facility]

## 2022-07-25 NOTE — HISTORY OF PRESENT ILLNESS
[FreeTextEntry1] : Ms. BAILEY SUN, 72 year old female, former smoker (40PY; Quit April, 2022), w/ hx of HTN and colon cancer s/p resection and chemo 2000, who presented for routine yearly lung cancer screening, found to have enlarging RML pulmonary nodule. She underwent FNA biopsy on 11/29 which revealed adenocarcinoma, favor lung primary. \par \par Now, approx 7 months s/p Bronchoscopy, Right VATS Robotic Assisted, Right Middle Lobectomy with hilar and mediastinal lymph node dissection on 1/18/22. Path of RML lobectomy reveals Invasive Acinar  Adenocarcinoma with small focal lipidic component, pT1aN0, 1 cm, G3, All margins negative, All LN negative (0/9). Additional findings, microscopic tumorlet, Negative staining for PDL-1. \par \par CT Chest on 4/13/22:\par - Focal area of nodular thickening along the suture line; indeterminate. \par \par * Discussed with patient:  Suspected to likely be loculated fluid. Recommended repeat imaging in 3 months. \par \par CT Chest on 7/11/22:\par - RM Lobectomy with interval decrease in 1.2 x 1 cm nodular opacity along the resection site (previously  2 x 1.7 cm).\par - New branching and clustered micronodular opacities at the right base (images 109-117, series 3).\par - Right lower lobe 0.4 cm ground-glass nodule (image 96, series 3) unchanged.\par \par Patient is here today for 3 month follow up.

## 2022-07-25 NOTE — ASSESSMENT
[FreeTextEntry1] : Ms. BAILEY SUN, 72 year old female, former smoker (40PY; Quit April, 2022), w/ hx of HTN and colon cancer s/p resection and chemo 2000, who presented for routine yearly lung cancer screening, found to have enlarging RML pulmonary nodule. She underwent FNA biopsy on 11/29 which revealed adenocarcinoma, favor lung primary. \par \par Now, approx 7 months s/p Bronchoscopy, Right VATS Robotic Assisted, Right Middle Lobectomy with hilar and mediastinal lymph node dissection for  Invasive Acinar Adenocarcinoma with small focal lipidic component, pT1aN0; All margins negative. Additional findings, microscopic tumorlet, Negative staining for PDL-1. \par \par April,2022 CT demonstrating  nodular thickening along suture line; Suspected to likely be loculated fluid. Here today with follow up imaging. \par \par I have independently reviewed the medical records and imaging at the time of this office consultation. \par Scan is improved, plan follow up in 6 months with repeat CAT scan of the chest. Overall doing well. \par

## 2023-01-16 ENCOUNTER — APPOINTMENT (OUTPATIENT)
Dept: CT IMAGING | Facility: CLINIC | Age: 73
End: 2023-01-16
Payer: MEDICARE

## 2023-01-16 ENCOUNTER — OUTPATIENT (OUTPATIENT)
Dept: OUTPATIENT SERVICES | Facility: HOSPITAL | Age: 73
LOS: 1 days | End: 2023-01-16
Payer: MEDICARE

## 2023-01-16 DIAGNOSIS — Z98.890 OTHER SPECIFIED POSTPROCEDURAL STATES: Chronic | ICD-10-CM

## 2023-01-16 DIAGNOSIS — C34.90 MALIGNANT NEOPLASM OF UNSPECIFIED PART OF UNSPECIFIED BRONCHUS OR LUNG: ICD-10-CM

## 2023-01-16 DIAGNOSIS — Z96.659 PRESENCE OF UNSPECIFIED ARTIFICIAL KNEE JOINT: Chronic | ICD-10-CM

## 2023-01-16 DIAGNOSIS — Z87.39 PERSONAL HISTORY OF OTHER DISEASES OF THE MUSCULOSKELETAL SYSTEM AND CONNECTIVE TISSUE: Chronic | ICD-10-CM

## 2023-01-16 DIAGNOSIS — Z90.49 ACQUIRED ABSENCE OF OTHER SPECIFIED PARTS OF DIGESTIVE TRACT: Chronic | ICD-10-CM

## 2023-01-16 PROCEDURE — 71250 CT THORAX DX C-: CPT | Mod: 26,MH

## 2023-01-16 PROCEDURE — 71250 CT THORAX DX C-: CPT

## 2023-01-19 ENCOUNTER — APPOINTMENT (OUTPATIENT)
Dept: THORACIC SURGERY | Facility: CLINIC | Age: 73
End: 2023-01-19
Payer: MEDICARE

## 2023-01-19 ENCOUNTER — NON-APPOINTMENT (OUTPATIENT)
Age: 73
End: 2023-01-19

## 2023-01-19 DIAGNOSIS — C34.90 MALIGNANT NEOPLASM OF UNSPECIFIED PART OF UNSPECIFIED BRONCHUS OR LUNG: ICD-10-CM

## 2023-01-19 DIAGNOSIS — R91.1 SOLITARY PULMONARY NODULE: ICD-10-CM

## 2023-01-19 PROCEDURE — 99443: CPT | Mod: 95

## 2023-01-19 NOTE — REASON FOR VISIT
[Home] : at home, [unfilled] , at the time of the visit. [Medical Office: ([facility])___] : at the medical office located in  [Patient] : the patient [Self] : self [Follow-Up: _____] : a [unfilled] follow-up visit

## 2023-01-20 NOTE — CONSULT LETTER
[Dear  ___] : Dear  [unfilled], [Courtesy Letter:] : I had the pleasure of seeing your patient, [unfilled], in my office today. [Sincerely,] : Sincerely, [FreeTextEntry2] : Dr. Addison (PULM/ref)\par Dr. Jeremy Canchola (ONC)\par Dr. Yadiel Washburn (GI)\par Dr. Abhinav Jennings (CARDS)\par   [FreeTextEntry3] : Deon Galan MD, FACS \par Vice Chairperson, Thoracic Surgery, [facility] Department of Cardiovascular & Thoracic Surgery \par , [facility] [employer] at [facility]

## 2023-01-20 NOTE — HISTORY OF PRESENT ILLNESS
[Home] : at home, [unfilled] , at the time of the visit. [Medical Office: ([facility])___] : at the medical office located in  [Verbal consent obtained from patient] : the patient, [unfilled] [FreeTextEntry1] : \par Ms. BAILEY SUN, 72 year old female, former smoker (40PY; Quit April, 2022), w/ hx of HTN and colon cancer s/p resection and chemo 2000, who presented for routine yearly lung cancer screening, found to have enlarging RML pulmonary nodule. She underwent FNA biopsy on 11/29 which revealed adenocarcinoma, favor lung primary. \par \par Now, s/p Bronchoscopy, Right VATS Robotic Assisted, Right Middle Lobectomy with hilar and mediastinal lymph node dissection on 1/18/22. Path of RML lobectomy reveals Invasive Acinar  Adenocarcinoma with small focal lipidic component, pT1aN0, 1 cm, G3, All margins negative, All LN negative (0/9). Additional findings, microscopic tumorlet, Negative staining for PDL-1. \par \par CT Chest on 4/13/22:\par - Focal area of nodular thickening along the suture line; indeterminate. \par \par * Discussed with patient:  Suspected to likely be loculated fluid. Recommended repeat imaging in 3 months. \par \par CT Chest on 7/11/22:\par - RM Lobectomy with interval decrease in 1.2 x 1 cm nodular opacity along the resection site (previously  2 x 1.7 cm).\par - New branching and clustered micronodular opacities at the right base (images 109-117, series 3).\par - Right lower lobe 0.4 cm ground-glass nodule (image 96, series 3) unchanged.\par \par CT Chest on 01/16/2023: \par - Status post right middle lobectomy. Decrease in size of soft tissue nodularity surrounding the staple line. Stable 4 mm groundglass nodules which can be reassessed on surveillance imaging. No new lung nodule.\par - Decreased clustered small airways impaction in the posterobasal right lower lobe\par \par Patient is here today for 6 month follow up.

## 2023-01-20 NOTE — ASSESSMENT
[FreeTextEntry1] : Ms. BAILEY SUN, 72 year old female, former smoker (40PY; Quit April, 2022), w/ hx of HTN and colon cancer s/p resection and chemo 2000, who presented for routine yearly lung cancer screening, found to have enlarging RML pulmonary nodule. She underwent FNA biopsy on 11/29 which revealed adenocarcinoma, favor lung primary. \par \par Now, s/p Bronchoscopy, Right VATS Robotic Assisted, Right Middle Lobectomy with hilar and mediastinal lymph node dissection for  Invasive Acinar Adenocarcinoma with small focal lipidic component, pT1aN0; All margins negative. Additional findings, microscopic tumorlet, Negative staining for PDL-1. \par \par CT chest compared, decrease in size of soft tissue nodularity surrounding the staple line, plan follow up in 6 months with repeat CT chest w/o contrast. If next scan stable, will f/u yearly with CT chest.

## 2023-05-19 ENCOUNTER — APPOINTMENT (OUTPATIENT)
Dept: ORTHOPEDIC SURGERY | Facility: CLINIC | Age: 73
End: 2023-05-19
Payer: MEDICARE

## 2023-05-19 DIAGNOSIS — M75.42 IMPINGEMENT SYNDROME OF LEFT SHOULDER: ICD-10-CM

## 2023-05-19 DIAGNOSIS — M75.41 IMPINGEMENT SYNDROME OF RIGHT SHOULDER: ICD-10-CM

## 2023-05-19 PROCEDURE — 73030 X-RAY EXAM OF SHOULDER: CPT | Mod: 50

## 2023-05-19 PROCEDURE — 20611 DRAIN/INJ JOINT/BURSA W/US: CPT | Mod: RT

## 2023-05-19 PROCEDURE — 99204 OFFICE O/P NEW MOD 45 MIN: CPT | Mod: 25

## 2023-05-19 NOTE — PROCEDURE
[FreeTextEntry3] : Large Joint Injection was performed because of pain and inflammation.\par \par Anesthesia: ethyl chloride sprayed topically..\par \par Depomedrol: An injection of Depomedrol 40 mg , 1 cc.\par \par Lidocaine: 3 cc.\par \par Medication was injected in the right bicipital grove. Patient has tried OTC's including aspirin, Ibuprofen, Aleve etc or prescription NSAIDS, and/or exercises at home and/ or physical therapy without satisfactory response and Patient has decreased mobility in the joint. After verbal consent using sterile preparation and technique. The risks, benefits, and alternatives to cortisone injection were explained in full to the patient. Risks outlined include but are not limited to infection, sepsis, bleeding, scarring, skin discoloration, temporary increase in pain, syncopal episode, failure to resolve symptoms, allergic reaction, symptom recurrence, and elevation of blood sugar in diabetics. Patient understood the risks. All questions were answered. After discussion of options, patient requested an injection. Oral informed consent was obtained and sterile prep was done of the injection site. Sterile technique was utilized for the procedure including the preparation of the solutions used for the injection. Patient tolerated the procedure well. Advised to ice the injection site this evening. Prep with alcohol locally to site. Sterile technique used. Patient tolerated procedure well. Post Procedure Instructions: Patient was advised to call if redness, pain, or fever occur and apply ice for 15 min. out of every hour for the next 12-24 hours as tolerated. patient was advised to rest the joint(s) for 7 days.\par \par Ultrasound Guidance was used for the following reasons: prior failure or difficult injection.\par \par Ultrasound guided injection was performed of the shoulder, visualization of the needle and placement of injection was performed without complication.\par

## 2023-05-19 NOTE — HISTORY OF PRESENT ILLNESS
[de-identified] : 72 y/o F presents to the office with b/l shoulder pain. Denies injury or trauma. She saw a spine specialist and had 4 rods placed in lower back. No previous conservative treatment done. Pain affects her while laying down. Pt reports she is right handed. States  neck pain. In the past, she has had past surgical history on both knees. 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 She was also previously diagnosed with colon cancer. Her past medical hx also includes an injection for her L hip which provided moderate relief. In regards to her shoulder, pain fluctuates throughout the day. Pt admits right shoulder is more painful currently \par \par

## 2023-05-19 NOTE — PHYSICAL EXAM
[Bilateral] : shoulder bilaterally [No bony abnormalities] : No bony abnormalities [Type 2 acromion] : Type 2 acromion [Calcific density] : Calcific density [de-identified] : Constitutional: The general appearance of the patient is well developed, well nourished, no deformities and well groomed. Normal\par \par Gait: Gait and function is as follows: normal gait.\par \par Skin: Head and neck visualized skin is normal. Left upper extremity visualized skin is normal. Right upper extremity visualized skin is normal. Thoracic Skin of the thoracic spine shows visualized skin is normal.\par \par Cardiovascular: palpable radial pulse bilaterally, good capillary refill in digits of the bilateral upper extremities and no temperature or color changes in the bilateral upper extremities.\par \par Lymphatic: Normal Palpation of lymph nodes in the cervical.\par \par Neurologic: fine motor control in the bilateral upper extremities is intact. Deep Tendon Reflexes in Upper and Lower Extremities Negative Hansen's in the bilateral upper extremities. The patient is oriented to time, place and person. Sensation to light touch intact in the bilateral upper extremities. Mood and Affect is normal.\par \par Right Shoulder: Inspection of the shoulder/upper arm is as follows: no scapula winging, no biceps deformity and no AC joint deformity. Palpation of the shoulder/upper arm is as follows: There is tenderness at the proximal biceps tendon. Range of motion of the shoulder is as follows: Pain with internal rotation, external rotation, abduction and forward flexion. Strength of the shoulder is as follows: Supraspinatus 4/5. External Rotation 4/5. Internal Rotation 4/5. Infraspinatus 5/5 4/5. Deltoid 5/5 Ligament Stability and Special Tests of the shoulder is as follows: Neer test is positive. Robertson' test is positive.\par \par Left Shoulder: Inspection of the shoulder/upper arm is as follows: no scapula winging, no biceps deformity and no AC joint deformity. Palpation of the shoulder/upper arm is as follows: There is tenderness at the proximal biceps tendon. Range of motion of the shoulder is as follows: Pain with internal rotation, external rotation, abduction and forward flexion. Strength of the shoulder is as follows: Supraspinatus 4/5. External Rotation 4/5. Internal Rotation 4/5. Infraspinatus 5/5 4/5. Deltoid 5/5 Ligament Stability and Special Tests of the shoulder is as follows: Neer test is positive. Robertson' test is positive.\par \par Neck:\par \par Inspection / Palpation of the cervical spine is as follows: mild paracervical tenderness. Range of motion of the cervical spine is as follows: moderately decreased range of motion of the cervical spine. Stability testing for the cervical spine is as follows Stable range of motion.\par \par Back, including spine: Inspection / Palpation of the thoracic/lumbar spine is as follows: There is a full, pain free, stable range of motion of the thoracic spine with a normal tone and not tenderness to palpation \par

## 2023-05-19 NOTE — DISCUSSION/SUMMARY
[de-identified] : 72 y/o F presents to the office with b/l shoulder pain.\par B/L xrays today demonstrate no teresa abnormalities but shows mild calcium deposit \par Patient has mild stiffness on exam today. \par She has significant tenderness in region of anterior shoulder.\par Patient was treated today with right US guided Biceps injection today for diagnostic and therapeutic purposes.\par \par follow up 1 week for possible left shoulder injection and MRI if no improvement\par \par \par \par (1) We discussed a comprehensive treatment plans that included a prescription management plan involving the use of prescription strength medications to include Ibuprofen 600-800 mg TID, versus 500-650 mg Tylenol. We also discussed prescribing topical diclofenac (Voltaren gel) as well as once daily Meloxicam 15 mg.\par (2) The patient has More Than One chronic injuries/illnesses as outlined, discussed, and documented by ICD 10 codes listed, as well as the HPI and Plan section.\par There is a moderate risk of morbidity with further treatment, especially from use of prescription strength medications and possible side effects of these medications which include upset stomach and cardiac/renal issues with long term use were discussed.\par (3) I recommended that the patient follow-up with their medical physician to discuss any significant specific potential issues with long term use such as interactions with current medications or with exacerbation of underlying medical morbidities. \par \par Attestation:\par I, Rosie BOBO'Kinjal , attest that this documentation has been prepared under the direction and in the presence of Provider Ricardo Bennett MD.\par The documentation recorded by the scribe, in my presence, accurately reflects the service I personally performed, and the decisions made by me with my edits as appropriate.\par Ricardo Bennett MD\par

## 2023-06-02 ENCOUNTER — APPOINTMENT (OUTPATIENT)
Dept: ORTHOPEDIC SURGERY | Facility: CLINIC | Age: 73
End: 2023-06-02
Payer: MEDICARE

## 2023-06-02 DIAGNOSIS — M75.21 BICIPITAL TENDINITIS, RIGHT SHOULDER: ICD-10-CM

## 2023-06-02 DIAGNOSIS — M25.512 PAIN IN LEFT SHOULDER: ICD-10-CM

## 2023-06-02 DIAGNOSIS — M75.22 BICIPITAL TENDINITIS, LEFT SHOULDER: ICD-10-CM

## 2023-06-02 DIAGNOSIS — M25.511 PAIN IN RIGHT SHOULDER: ICD-10-CM

## 2023-06-02 PROCEDURE — 99214 OFFICE O/P EST MOD 30 MIN: CPT | Mod: 25

## 2023-06-02 PROCEDURE — 20611 DRAIN/INJ JOINT/BURSA W/US: CPT | Mod: LT

## 2023-06-02 NOTE — PROCEDURE
[FreeTextEntry3] : Large Joint Injection was performed because of pain and inflammation.\par \par Anesthesia: ethyl chloride sprayed topically..\par \par Depomedrol: An injection of Depomedrol 40 mg , 1 cc.\par \par Lidocaine: 3 cc.\par \par Medication was injected in the left bicipital grove. Patient has tried OTC's including aspirin, Ibuprofen, Aleve etc or prescription NSAIDS, and/or exercises at home and/ or physical therapy without satisfactory response and Patient has decreased mobility in the joint. After verbal consent using sterile preparation and technique. The risks, benefits, and alternatives to cortisone injection were explained in full to the patient. Risks outlined include but are not limited to infection, sepsis, bleeding, scarring, skin discoloration, temporary increase in pain, syncopal episode, failure to resolve symptoms, allergic reaction, symptom recurrence, and elevation of blood sugar in diabetics. Patient understood the risks. All questions were answered. After discussion of options, patient requested an injection. Oral informed consent was obtained and sterile prep was done of the injection site. Sterile technique was utilized for the procedure including the preparation of the solutions used for the injection. Patient tolerated the procedure well. Advised to ice the injection site this evening. Prep with alcohol locally to site. Sterile technique used. Patient tolerated procedure well. Post Procedure Instructions: Patient was advised to call if redness, pain, or fever occur and apply ice for 15 min. out of every hour for the next 12-24 hours as tolerated. patient was advised to rest the joint(s) for 7 days.\par \par Ultrasound Guidance was used for the following reasons: prior failure or difficult injection.\par \par Ultrasound guided injection was performed of the shoulder, visualization of the needle and placement of injection was performed without complication.\par

## 2023-06-02 NOTE — DISCUSSION/SUMMARY
[de-identified] : LUE: TTP LHBT, pain with bear hug and belly press\par +Speeds referred to biceps, + Obriens\par Pain and 90/90 ER \par \par 74 y/o F presents to the office with b/l shoulder pain.\par Prior B/L xrays demonstrate no teresa abnormalities but shows mild calcium deposit \par Prior biceps injection provided significant relief\par Biceps injection for left shoulder today\par follow up as medically needed\par \par \par \par (1) We discussed a comprehensive treatment plans that included a prescription management plan involving the use of prescription strength medications to include Ibuprofen 600-800 mg TID, versus 500-650 mg Tylenol. We also discussed prescribing topical diclofenac (Voltaren gel) as well as once daily Meloxicam 15 mg.\par (2) The patient has More Than One chronic injuries/illnesses as outlined, discussed, and documented by ICD 10 codes listed, as well as the HPI and Plan section.\par There is a moderate risk of morbidity with further treatment, especially from use of prescription strength medications and possible side effects of these medications which include upset stomach and cardiac/renal issues with long term use were discussed.\par (3) I recommended that the patient follow-up with their medical physician to discuss any significant specific potential issues with long term use such as interactions with current medications or with exacerbation of underlying medical morbidities. \par \par Attestation:\par I, Rosie BOBO'Kinjal , attest that this documentation has been prepared under the direction and in the presence of Provider Ricardo Bennett MD.\par The documentation recorded by the scribe, in my presence, accurately reflects the service I personally performed, and the decisions made by me with my edits as appropriate.\par Ricardo Bennett MD\par

## 2023-06-02 NOTE — PHYSICAL EXAM
[de-identified] : Constitutional: The general appearance of the patient is well developed, well nourished, no deformities and well groomed. Normal\par \par Gait: Gait and function is as follows: normal gait.\par \par Skin: Head and neck visualized skin is normal. Left upper extremity visualized skin is normal. Right upper extremity visualized skin is normal. Thoracic Skin of the thoracic spine shows visualized skin is normal.\par \par Cardiovascular: palpable radial pulse bilaterally, good capillary refill in digits of the bilateral upper extremities and no temperature or color changes in the bilateral upper extremities.\par \par Lymphatic: Normal Palpation of lymph nodes in the cervical.\par \par Neurologic: fine motor control in the bilateral upper extremities is intact. Deep Tendon Reflexes in Upper and Lower Extremities Negative Hansen's in the bilateral upper extremities. The patient is oriented to time, place and person. Sensation to light touch intact in the bilateral upper extremities. Mood and Affect is normal.\par \par Right Shoulder: Inspection of the shoulder/upper arm is as follows: no scapula winging, no biceps deformity and no AC joint deformity. Palpation of the shoulder/upper arm is as follows: There is tenderness at the proximal biceps tendon. Range of motion of the shoulder is as follows: Pain with internal rotation, external rotation, abduction and forward flexion. Strength of the shoulder is as follows: Supraspinatus 4/5. External Rotation 4/5. Internal Rotation 4/5. Infraspinatus 5/5 4/5. Deltoid 5/5 Ligament Stability and Special Tests of the shoulder is as follows: Neer test is positive. Robertson' test is positive.\par \par Left Shoulder: Inspection of the shoulder/upper arm is as follows: no scapula winging, no biceps deformity and no AC joint deformity. Palpation of the shoulder/upper arm is as follows: There is tenderness at the proximal biceps tendon. Range of motion of the shoulder is as follows: Pain with internal rotation, external rotation, abduction and forward flexion. Strength of the shoulder is as follows: Supraspinatus 4/5. External Rotation 4/5. Internal Rotation 4/5. Infraspinatus 5/5 4/5. Deltoid 5/5 Ligament Stability and Special Tests of the shoulder is as follows: Neer test is positive. Robertson' test is positive.\par \par Neck:\par \par Inspection / Palpation of the cervical spine is as follows: mild paracervical tenderness. Range of motion of the cervical spine is as follows: moderately decreased range of motion of the cervical spine. Stability testing for the cervical spine is as follows Stable range of motion.\par \par Back, including spine: Inspection / Palpation of the thoracic/lumbar spine is as follows: There is a full, pain free, stable range of motion of the thoracic spine with a normal tone and not tenderness to palpation \par

## 2023-06-02 NOTE — HISTORY OF PRESENT ILLNESS
[de-identified] : 72 y/o F presents to the office with b/l shoulder pain. Denies injury or trauma. She saw a spine specialist and had 4 rods placed in lower back. No previous conservative treatment done. Pain affects her while laying down. Pt reports she is right handed. States  neck pain. In the past, she has had past surgical history on both knees. 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 She was also previously diagnosed with colon cancer. Her past medical hx also includes an injection for her L hip which provided moderate relief. In regards to her shoulder, pain fluctuates throughout the day. Pt admits right shoulder is more painful currently \par \par 6/2/23: Pt here for b/l/ shoulder pain. Prior biceps injection in right shoulder provided significant relief Pt has little to no pain in right shoulder currently. Pt interested in left shoulder injection

## 2023-07-19 ENCOUNTER — APPOINTMENT (OUTPATIENT)
Dept: CT IMAGING | Facility: CLINIC | Age: 73
End: 2023-07-19
Payer: MEDICARE

## 2023-07-19 ENCOUNTER — OUTPATIENT (OUTPATIENT)
Dept: OUTPATIENT SERVICES | Facility: HOSPITAL | Age: 73
LOS: 1 days | End: 2023-07-19
Payer: MEDICARE

## 2023-07-19 DIAGNOSIS — C34.90 MALIGNANT NEOPLASM OF UNSPECIFIED PART OF UNSPECIFIED BRONCHUS OR LUNG: ICD-10-CM

## 2023-07-19 DIAGNOSIS — Z90.49 ACQUIRED ABSENCE OF OTHER SPECIFIED PARTS OF DIGESTIVE TRACT: Chronic | ICD-10-CM

## 2023-07-19 DIAGNOSIS — Z98.890 OTHER SPECIFIED POSTPROCEDURAL STATES: Chronic | ICD-10-CM

## 2023-07-19 DIAGNOSIS — Z87.39 PERSONAL HISTORY OF OTHER DISEASES OF THE MUSCULOSKELETAL SYSTEM AND CONNECTIVE TISSUE: Chronic | ICD-10-CM

## 2023-07-19 DIAGNOSIS — Z96.659 PRESENCE OF UNSPECIFIED ARTIFICIAL KNEE JOINT: Chronic | ICD-10-CM

## 2023-07-19 PROCEDURE — 71250 CT THORAX DX C-: CPT | Mod: 26,MH

## 2023-07-19 PROCEDURE — 71250 CT THORAX DX C-: CPT

## 2023-08-03 NOTE — DISCHARGE NOTE NURSING/CASE MANAGEMENT/SOCIAL WORK - NSPROEXTENSIONSOFSELF_GEN_A_NUR
"Subjective:       Patient ID: Bebeto Hill is a 74 y.o. male.    Chief Complaint: Follow-up (3 month follow up), Diabetes (Patient reports he monitors glucose "occasionally". ), Hypertension (Patient reports he does not monitor BP routinely but has BP cuff at home. ), and Hyperlipidemia    Bebeto Hill presents to the clinic today for routine 3 month follow up  He is under the care of the following:   Krys Vega- [facility]- saw 3 months ago   [facility]- receives injections to the left eye monthly   Cardiology: Dr. Viera- last seen 2018     Patient Active Problem List  Diagnosis  · Primary osteoarthritis  · Carcinoma of prostate  · Essential hypertension  · Mixed hyperlipidemia  · Type 2 diabetes mellitus with hyperglycemia, without long-term current use of insulin  · Statin intolerance  · Former cigarette smoker  · History of prostate cancer    Diabetes:  Patient reports he monitors glucose "occasionally".     Hypertension:  Patient reports he does not monitor BP routinely but has BP cuff at home.        Review of Systems   Constitutional:  Negative for chills, diaphoresis, fatigue and fever.   HENT:  Negative for congestion, ear pain, postnasal drip, sinus pressure, sneezing and sore throat.    Eyes:  Positive for visual disturbance. Negative for pain and redness.        Seeing Opthalmology in [place], [place]   Respiratory:  Negative for cough, chest tightness, shortness of breath and wheezing.    Cardiovascular:  Negative for chest pain and leg swelling.   Gastrointestinal:  Negative for abdominal distention, abdominal pain, constipation, diarrhea, nausea and vomiting.   Endocrine: Negative.    Genitourinary:  Negative for difficulty urinating, dysuria, flank pain, frequency, penile pain and testicular pain.   Musculoskeletal: Negative.    Skin:  Negative for color change, rash and wound.   Allergic/Immunologic: Negative.    Neurological:  Negative for dizziness, syncope, weakness, " light-headedness and headaches.   Hematological: Negative.    Psychiatric/Behavioral: Negative.     All other systems reviewed and are negative.      Patient Active Problem List   Diagnosis    Primary osteoarthritis    Carcinoma of prostate    Essential hypertension    Mixed hyperlipidemia    Type 2 diabetes mellitus with hyperglycemia, without long-term current use of insulin    Statin intolerance    Former cigarette smoker    History of prostate cancer       Objective:      Physical Exam  Vitals and nursing note reviewed.   Constitutional:       General: He is not in acute distress.     Appearance: Normal appearance. He is obese.   Eyes:      Extraocular Movements: Extraocular movements intact.      Conjunctiva/sclera: Conjunctivae normal.   Cardiovascular:      Rate and Rhythm: Normal rate and regular rhythm.      Heart sounds: Normal heart sounds. No murmur heard.  Pulmonary:      Effort: Pulmonary effort is normal. No respiratory distress.      Breath sounds: Normal breath sounds. No wheezing or rhonchi.   Abdominal:      General: Bowel sounds are normal. There is no distension.      Palpations: Abdomen is soft.      Tenderness: There is no abdominal tenderness.      Comments: Central obesity    Musculoskeletal:      Right lower leg: No edema.      Left lower leg: No edema.   Lymphadenopathy:      Cervical: No cervical adenopathy.   Skin:     General: Skin is warm and dry.      Findings: No rash.   Neurological:      General: No focal deficit present.      Mental Status: He is alert. Mental status is at baseline.   Psychiatric:         Attention and Perception: Attention and perception normal.         Mood and Affect: Mood normal.         Speech: Speech normal.         Behavior: Behavior normal.         Lab Results   Component Value Date    CHOL 229 (H) 05/03/2023    TRIG 267 (H) 05/03/2023    HDL 40 05/03/2023    ALT 14 05/03/2023    AST 13 05/03/2023     05/03/2023    K 4.2 05/03/2023      05/03/2023    CREATININE 1.1 05/03/2023    BUN 19 05/03/2023    CO2 25 05/03/2023    PSA <0.01 05/03/2023    HGBA1C 7.8 (H) 05/03/2023     The 10-year ASCVD risk score (Michelle FOSTER, et al., 2019) is: 45.7%    Values used to calculate the score:      Age: 74 years      Sex: Male      Is Non- : No      Diabetic: Yes      Tobacco smoker: No      Systolic Blood Pressure: 128 mmHg      Is BP treated: Yes      HDL Cholesterol: 40 mg/dL      Total Cholesterol: 229 mg/dL  Visit Vitals  /68 (BP Location: Right arm, Patient Position: Sitting, BP Method: Medium (Manual))   Pulse 65   Wt 100.1 kg (220 lb 10.9 oz)   SpO2 98%   BMI 31.66 kg/m²      Assessment:       1. Essential hypertension    2. Type 2 diabetes mellitus with hyperglycemia, without long-term current use of insulin    3. Mixed hyperlipidemia    4. History of prostate cancer    5. Statin intolerance    6. Encounter for abdominal aortic aneurysm (AAA) screening        Plan:       1. Essential hypertension  -     carvediloL (COREG) 12.5 MG tablet; Take 1 tablet (12.5 mg total) by mouth 2 (two) times daily with meals.  Dispense: 180 tablet; Refill: 1  -     enalapril (VASOTEC) 20 MG tablet; Take 1 tablet (20 mg total) by mouth 2 (two) times daily.  Dispense: 180 tablet; Refill: 1  -     hydroCHLOROthiazide (HYDRODIURIL) 25 MG tablet; Take 1 tablet (25 mg total) by mouth once daily.  Dispense: 90 tablet; Refill: 1  -     verapamiL (CALAN-SR) 240 MG CR tablet; Take 1 tablet (240 mg total) by mouth 2 (two) times daily.  Dispense: 180 tablet; Refill: 1  -     Microalbumin/Creatinine Ratio, Urine; Future; Expected date: 08/03/2023  The patient was counseled on HTN education, management and recommendations. The need for weight reduction was reinforced and a BMI goal of 19 to 25 was set.  Patient was encouraged to adhere to a low sodium diet and a DASH diet was recommended. Patient was also encouraged to engage in routine exercise such as walking  most days of the week greater than 30 minutes. Patient education materials were provided to the patient for home review and further reinforcement of topics discussed.     2. Type 2 diabetes mellitus with hyperglycemia, without long-term current use of insulin  -     glimepiride (AMARYL) 4 MG tablet; Take 1 tablet (4 mg total) by mouth 2 (two) times a day.  Dispense: 180 tablet; Refill: 1  -     metFORMIN (GLUCOPHAGE) 1000 MG tablet; Take 1 tablet (1,000 mg total) by mouth 2 (two) times daily with meals.  Dispense: 180 tablet; Refill: 1  -     Microalbumin/Creatinine Ratio, Urine; Future; Expected date: 08/03/2023  Take medications as prescribed    weight management- discussed elevated BMI- weight reduction by 10%.   low fat/ low cholesterol diet discussed   portion sizes    Follow a Low Starch Diet (simple carbohydrate)&nbsp     Decrease   1. Sugars   2. White Flour   3. White Potatoes   4. White Rice     Exercise 20 mins 3 x week work up to30 mins 4x week    monitor feet daily- if unable to see ask a friend/family member to examine feet or use a mirror. Let PCP know of any skin changes ASAP  GoldBond/moisturizing lotion to skin daily  maintain hydration    3. Mixed hyperlipidemia    4. History of prostate cancer    5. Statin intolerance  Strict dietary/lifestyle modifications encouraged.  6. Encounter for abdominal aortic aneurysm (AAA) screening  -     [place] AAA Screening; Future; Expected date: 08/03/2023       Follow up in about 3 months (around 11/3/2023).      Future Appointments       Date Provider Specialty Appt Notes    8/3/2023  Lab nonfasting    11/14/2023 Nolvia Valdez NP Family Medicine 3 month follow up              eyeglasses

## 2023-08-28 NOTE — DISCHARGE NOTE PROVIDER - NSDCCPTREATMENT_GEN_ALL_CORE_FT
mild
PRINCIPAL PROCEDURE  Procedure: Robotic assisted procedure, thoracoscopic  Findings and Treatment:

## 2024-06-17 NOTE — H&P PST ADULT - HEALTH CARE MAINTENANCE
[facility]  Hospitalist Admission Note  Name: Crispin Rojas    MRN: [id number]  YOB: 1960    Age: 64 year old  Date of admission: 6/17/2024  Primary care provider: Johann Serna    Chief Complaint:  foot ulcer draining    Assessment and Plan:   Chronic left diabetic foot ulcer  Suspect third metatarsal osteomyelitis: History of previous diabetic foot ulcers requiring multiple toe amputations.  Has had a left plantar foot ulcer in the area of the third metatarsal head for the last 8 months that has become much deeper with intermittent blood in purulent drainage over the last 6 to 8 weeks since he stopped going to the wound care clinic he says at the advice of his podiatrist.  There is a picture in Dr. Ji's note that shows out deep the ulcer goes likely to the bone.  There is significant purulent fluid coming out.  No significant surrounding erythema.  Highly suspect osteomyelitis of the third metatarsal.  X-ray shows worsening lucency at the third metatarsal head.  His CRP is elevated at 60 and ESR is 49.  WBC count normal at 6.7.  Temperature is 99  F, but does not appear septic.  Lactic acid is slightly elevated 2.1 in the setting of metformin use.  Left lower extremity ultrasound negative for DVT.  He is chronically on Augmentin daily prophylaxis.  Polymicrobial growth in the past including Pseudomonas resistant to josé quinolones, Enterococcus faecalis sensitive to ampicillin, Proteus Mirabella's, MSSA, and Finegoldia magna.  No MRSA history.  -IV Zosyn dosed at 4.5 g every 6 hours (recommended dose for diabetic foot ulcer if Pseudomonas is suspected which he has had in the past)  -IV vancomycin pharmacy to dose (no history MRSA, but frequently exposed to medical care)  -Blood cultures obtained  -Consult podiatry  -Ordered left foot MRI  -Allowing diet as I do not anticipate any surgery today  -Acetaminophen for mild pain, oral Dilaudid 1 mg for moderate and 2 mg for severe  pain every 3 hours as needed    History of DVT: Chronically on Eliquis 5 mg twice daily.  -Hold Eliquis for anticipated surgery    IDDM type II  Polyneuropathy: A1c 6.4.  He takes Ozempic weekly on Wednesdays, metformin XR 1000 mg twice daily, Tresiba usually 30 to 35 units in the morning if working hard as a , but in the winter sometimes uses up to 50 units.  Mostly needs protein with minimal carbs, but if he uses roughly 9 to 18 units aspart with a meal.  He has neuropathy, but does have some sensation in his feet.  -Glucose 85 this morning.  Start with Tresiba 25 units daily in the morning, may be n.p.o. overnight if surgery planned tomorrow  -1.5 units per 15 g carbohydrate aspart 3 times daily with meals  -Medium dose sliding scale aspart  -Hold metformin due to elevated lactic acid and hold Ozempic  -Resume PTA gabapentin 400 mg 3 times daily    CKD stage II: Creatinine baseline is likely 1-1.1 and he is 1.8 on admission.  -Hold lisinopril and hydrochlorothiazide  -BMP tomorrow    HTN  Essential tremor: He is on lisinopril 40 mg daily, HCTZ 12.5 mg daily, amlodipine 5 mg twice daily, and propranolol 20 mg daily.  -Hold lisinopril and hydrochlorothiazide  -Resume amlodipine and propranolol    History CVA  PAD  HLD: History of lacunar stroke in 2010.  He also has peripheral chill disease and hyperlipidemia.  He is on Eliquis for previous DVT in addition to atorvastatin 40 mg daily.  -Holding Eliquis for anticipated surgery  -Resume atorvastatin    GILA: Resume CPAP if using at home.    Obesity: BMI 35.    GERD: Substitute pantoprazole 40 mg daily for PTA omeprazole.    Chronic anemia: Hemoglobin at baseline of 10-11.    SNYDER    DVT Prophylaxis: Pneumatic Compression Devices, holding PTA Eliquis for surgery  Code Status: Full Code  FEN: Regular diet  Discharge Dispo: Anticipate home  Estimated Disch Date / # of Days until Disch: Admit inpatient for suspected osteomyelitis.  Anticipate likely 3 night  hospitalization and may need amputation while here.      History of Present Illness:  Crispin Rojas is a 64 year old male with PMH including IDDM type II, neuropathy, PAD, diabetic foot ulcers with previous amputations, CVA, DVT on Eliquis, HTN, HLD, obesity, GILA, anemia, SNYDER, and essential tremor who presents with worsening of his chronic left foot ulcer.  He has been dealing with a left plantar foot ulcer in the area of the third metatarsal for the last 8 months.  He sees podiatry in clinic.  They said he can stop seeing the wound clinic about 6 to 8 weeks ago and the ulcer has been worsening since then.  He reports that the ulcers become deeper and deeper and he thinks it is exposed bone now.  He has also had bloody and purulent drainage intermittently.  His left foot and lower leg are more edematous than normal.  He has not had any fevers or chills.  His blood sugars have been under good control.  He does work as a  at a golf [employer] and is on his feet every day for long hours.  He does have some sensation in his feet and by the end of the day he is limping due to the pain.  He loves his job and the thought of not being able to do it if he has an amputation is quite upsetting to him.    History obtained from patient, medical record, and from Dr. Ji in the emergency department.  Blood pressure 136/70, heart rate 82, temperature 99  F, oxygen 99% on room air.  WBC 6.7, hemoglobin 10.8, platelet count 205.  Sodium is 139 potassium 3.7, chloride 103, bicarb 20, BUN 23, creatinine 1.8, glucose 85.  A1c 6.4.  CRP elevated 60.3 as is ESR 49.  Lactic acid slightly elevated 2.1.  X-ray of the left foot shows increasing lucency of the third metatarsal head and diffuse soft tissue swelling.  Left lower extremity ultrasound negative for DVT.  He received IV Zosyn.  Admit inpatient with podiatry consultation.       Clinically Significant Risk Factors Present on Admission               # Drug Induced  "Coagulation Defect: home medication list includes an anticoagulant medication    # Hypertension: Noted on problem list    # Anemia: based on hgb <11           # Obesity: Estimated body mass index is 35.06 kg/m  as calculated from the following:    Height as of this encounter: 1.753 m (5' 9\").    Weight as of this encounter: 107.7 kg (237 lb 7 oz).                        Past Medical History reviewed:  Past Medical History:   Diagnosis Date    Antiplatelet or antithrombotic long-term use     Ascending aorta dilatation (H24)     Cerebral artery occlusion with cerebral infarction (H)     Cerebral infarction (H)     2010    Gastroesophageal reflux disease with esophagitis     H/O blood clots 2022    Hyperlipidemia LDL goal <70     Hypertension     Nonalcoholic steatohepatitis 11/30/2022    Numbness and tingling     Obesity     GILA (obstructive sleep apnea) 10/22/2018    CPAP intolerant    PVD (peripheral vascular disease) (H24)     s/p left partial toe amputation    Renal stone     Tremor     Type 2 diabetes mellitus with diabetic polyneuropathy, with long-term current use of insulin (H)      Past Surgical History reviewed:  Past Surgical History:   Procedure Laterality Date    AMPUTATE FOOT Left 8/18/2016    Procedure: AMPUTATE FOOT;  Surgeon: Jack Younger DPM;  Location: [facility]    AMPUTATE TOE(S) Right 2/1/2016    Procedure: AMPUTATE TOE(S);  Surgeon: Rachelle Manriquez DPM, Pod;  Location: [facility]    AMPUTATE TOE(S) Right 8/2/2019    Procedure: Right fourth toe partial amputation for treatment of osteomyelitis.;  Surgeon: Jack Younger DPM;  Location: [facility]    AMPUTATE TOE(S) Left 11/8/2019    Procedure: Left second toe amputation at the metatarsophalangeal joint.;  Surgeon: Rachelle Manriquez DPM, Podiatry/Foot and Ankle Surgery;  Location: [facility]    AMPUTATE TOE(S) Right 6/5/2022    Procedure: AMPUTATION OF RIGHT GREAT TOE;  Surgeon: Wili Quiles DPM;  Location: [facility]    AMPUTATE TOE(S) Right 7/28/2022 "    Procedure: Right foot partial first metatarsal resection;  Surgeon: Tiny Soto DPM;  Location: [facility]    ANGIOGRAM      BIOPSY BONE FOOT Right 7/24/2022    Procedure: Bone biopsy, right first metatarsal.;  Surgeon: Valentino Molina DPM;  Location: [facility]    COLONOSCOPY      COMBINED CYSTOSCOPY, RETROGRADES, URETEROSCOPY, INSERT STENT Left 8/21/2016    Procedure: COMBINED CYSTOSCOPY, RETROGRADES, URETEROSCOPY, INSERT STENT;  Surgeon: Artemio Valenzuela MD;  Location: [facility]    COMBINED CYSTOSCOPY, RETROGRADES, URETEROSCOPY, LASER HOLMIUM LITHOTRIPSY URETER(S), INSERT STENT Left 10/3/2016    Procedure: COMBINED CYSTOSCOPY, RETROGRADES, URETEROSCOPY, LASER HOLMIUM LITHOTRIPSY URETER(S), INSERT STENT;  Surgeon: Artemio Valenzuela MD;  Location: [facility]    EXTRACORPOREAL SHOCK WAVE LITHOTRIPSY (ESWL) Left 9/8/2016    Procedure: EXTRACORPOREAL SHOCK WAVE LITHOTRIPSY (ESWL);  Surgeon: Artemio Valenzuela MD;  Location: [facility]    INCISION AND DRAINAGE FOOT, COMBINED Right 7/24/2022    Procedure: Incision and drainage, right foot ;  Surgeon: Valentino Molina DPM;  Location: [facility]    IRRIGATION AND DEBRIDEMENT FOOT, COMBINED Left 10/19/2023    Procedure: Left foot second metatarsal resection , . Left plantar ulcer excisional debridement, down to and including tendon, with closure, site measuring 4 cm x 2 cm x 1 cm;  Surgeon: Tiny Soto DPM;  Location: [facility]    OSTEOTOMY FOOT Right 11/30/2022    Procedure: 1. Right second metatarsal floating osteotomy  2. Right second digit proximal phalanx arthroplasty 3. Right percutaneous flexor tenotomy;  Surgeon: Tiny Soto DPM;  Location: [facility]    OSTEOTOMY FOOT Right 1/19/2023    Procedure: Right foot third metatarsal head excision, ulcer debridement, matatarsal osteotomies;  Surgeon: Tiny Soto DPM;  Location: [facility]    RECESSION GASTROCNEMIUS Right 2/1/2016    Procedure: RECESSION GASTROCNEMIUS;  Surgeon: Rachelle Manriquez DPM, Pod;   Location: [facility]     Social History reviewed:  Social History     Tobacco Use    Smoking status: Never    Smokeless tobacco: Never   Substance Use Topics    Alcohol use: Yes     Comment: rare---red wine 3x per month     Social History     Social History Narrative    Not on file     Family History reviewed:  Family History   Problem Relation Age of Onset    Arthritis Mother         SLE    Connective Tissue Disorder Mother         lupus    Diabetes Mother     Cerebrovascular Disease Mother     Cancer Mother     Diabetes Father     Coronary Artery Disease Father     Chronic Obstructive Pulmonary Disease Father     Diabetes Sister     Diabetes Maternal Grandfather      Allergies:  Allergies   Allergen Reactions    Statins Swelling     Other reaction(s): Other (see comments)  SIMCOR caused edema in extremities      Bactrim [Sulfamethoxazole-Trimethoprim] Nausea and Vomiting    Sulfa Antibiotics Nausea    Sulfatolamide Nausea and Vomiting    Niacin Swelling     SIMCOR caused edema in extremities    Simvastatin Swelling     SIMCOR caused edema in extremities     Medications:  Prior to Admission medications    Medication Sig Last Dose Taking? Auth Provider Long Term End Date   amLODIPine (NORVASC) 5 MG tablet Take 1 tablet (5 mg) by mouth 2 times daily   Singh Theodore MD Yes    amoxicillin-clavulanate (AUGMENTIN) 875-125 MG tablet Take 1 tablet by mouth daily   Reported, Patient No    atorvastatin (LIPITOR) 40 MG tablet Take 1 tablet (40 mg) by mouth daily Take one tablet daily SEE PROVIDER FOR NEXT REFILL   Johann Serna DO Yes    blood glucose (NO BRAND SPECIFIED) lancets standard Use to test blood sugar 3 times daily or as directed.   Jasmin Arteaga APRN CNP     calcium carbonate (OS-NARA) 500 MG tablet Take 1 tablet by mouth 2 times daily   Reported, Patient     Cholecalciferol (VITAMIN D3) 25 MCG (1000 UT) CAPS Take 1,000 Units by mouth daily    Reported, Patient     Co-Enzyme Q10 100 MG CAPS Take 100 mg  by mouth daily    Aden Lopez MD     Continuous Blood Gluc Sensor (FREESTYLE LUCERO 14 DAY SENSOR) [facility] [place] USE ONE EVERY 14 DAYS   Shira Montanez NP     ELIQUIS ANTICOAGULANT 5 MG tablet Take 1 tablet (5 mg) by mouth 2 times daily   Johann Serna DO No    ferrous sulfate (FEROSUL) 325 (65 Fe) MG tablet Take 325 mg by mouth daily (with breakfast)   Reported, Patient     gabapentin (NEURONTIN) 100 MG capsule Take 400 mg by mouth 3 times daily   Unknown, Entered By History Yes    gentamicin (GARAMYCIN) 0.1 % external ointment Apply 5g topically to wound with bandage changes.   Alyssa Rosenthal PA-C     hydrochlorothiazide (MICROZIDE) 12.5 MG capsule Take 1 capsule (12.5 mg) by mouth every morning   Ren, Cindi, NP Yes    Insulin Aspart FlexPen 100 UNIT/ML SOPN ADMINISTER 70 UNITS UNDER THE SKIN DAILY   Shira Montanez NP Yes    insulin degludec (TRESIBA FLEXTOUCH) 100 UNIT/ML pen Administer 50 - 55 units under skin daily   Shira Montanez NP     insulin pen needle (B-D U/F) 31G X 5 MM miscellaneous USE TO INJECT LANTUS TWICE DAILY AND NOVOLOG THREE TIMES DAILY AS DIRECTED   Shira Montanez NP     ketoconazole (NIZORAL) 2 % external shampoo Apply topically daily as needed for itching or irritation See MD after 6 weeks   Cindi Mcclure NP     lisinopril (ZESTRIL) 40 MG tablet Take 1 tablet (40 mg) by mouth daily   Johann Serna DO Yes    MAGNESIUM GLYCINATE PLUS PO Take 400 mg by mouth 2 times daily   Reported, Patient     metFORMIN (GLUCOPHAGE XR) 500 MG 24 hr tablet TAKE 2 TABLETS(1000 MG) BY MOUTH TWICE DAILY   Shira Montanez NP Yes    Multiple Vitamins-Minerals (MULTIVITAMIN PO) Take 1 tablet by mouth daily    Reported, Patient     mupirocin (BACTROBAN) 2 % external ointment Apply a small amount to affected nares 2 times a day for one month.   Nikos Armstrong MD     Omega-3 Fatty Acids (OMEGA-3 FISH OIL PO) Take 1 g by mouth 2 times daily (with meals)   Patient not taking: Reported on  "4/22/2024   Reported, Patient     omeprazole (PRILOSEC) 40 MG DR capsule Take 40 mg by mouth daily   Reported, Patient     propranolol (INDERAL) 20 MG tablet Take 20 mg by mouth   Reported, Patient Yes    Semaglutide, 2 MG/DOSE, (OZEMPIC) 8 MG/3ML pen Inject 2 mg Subcutaneous every 7 days   Shira Montanez, NP No    tadalafil (CIALIS) 5 MG tablet TAKE 1 TABLET BY MOUTH EVERY DAY AS NEEDED one hour before intercourse   Cindi Mcclure, NP Yes    insulin lispro (HUMALOG KWIKPEN) 100 UNIT/ML (1 unit dial) KWIKPEN 1 units per 9 grams of carbohydrate before each meal + 1:25 over 150 correction.  TDD 70 units   Jasmin Arteaga APRN CNP Yes 2/2/21     Review of Systems:  A Comprehensive greater than 10 system review of systems was carried out.  Pertinent positives and negatives are noted above.  Otherwise negative.     Physical Exam:  Blood pressure 113/54, pulse 62, temperature 99  F (37.2  C), temperature source Temporal, resp. rate 18, height 1.753 m (5' 9\"), weight 107.7 kg (237 lb 7 oz), SpO2 96%.  Wt Readings from Last 1 Encounters:   06/17/24 107.7 kg (237 lb 7 oz)     Exam:  Constitutional: Awake, NAD  Eyes: sclera white   HEENT:  MMM  Respiratory: no respiratory distress, lungs cta bilaterally, no crackles or wheeze  Cardiovascular: RRR.  No murmur  GI: Obese, non-tender, not distended, bowel sounds present    Skin/Musculoskeletal/extremities: Large, deep circular left foot plantar ulcer in the area of the third metatarsal head with purulent drainage.  No significant surrounding erythema.  1+ edema of the foot and lower leg.  Right leg does not have any edema.  Neurologic: A&O, speech clear, does have some light to sensation intact in his feet and toes  Psychiatric: calm, cooperative, normal affect    Lab and imaging data personally reviewed:  Labs:  Recent Labs   Lab 06/17/24  0420   WBC 6.7   HGB 10.8*   HCT 33.7*   MCV 89        Recent Labs   Lab 06/17/24  0420      POTASSIUM 3.7   CHLORIDE 103 "   CO2 20*   ANIONGAP 16*   GLC 85   BUN 23.1*   CR 1.18*   GFRESTIMATED 70   NARA 8.5*     Recent Labs   Lab 06/17/24  0420   LACT 2.1*     CRP 60.3  ESR 49  A1c 6.4    Imaging:  Recent Results (from the past 24 hour(s))   US Lower Extremity Venous Duplex Left    Narrative    EXAM: US LOWER EXTREMITY VENOUS DUPLEX LEFT  LOCATION: [facility]  DATE: 6/17/2024    INDICATION: left leg edema  COMPARISON: None.  TECHNIQUE: Venous Duplex ultrasound of the left lower extremity with and without compression, augmentation and duplex. Color flow and spectral Doppler with waveform analysis performed.    FINDINGS: Exam includes the common femoral, femoral, popliteal, and contralateral common femoral veins as well as segmentally visualized deep calf veins and greater saphenous vein.     LEFT: No deep vein thrombosis. No superficial thrombophlebitis. No popliteal cyst.      Impression    IMPRESSION:  1.  No deep venous thrombosis in the left lower extremity.   Foot XR, G/E 3 views, left    Narrative    EXAM: XR FOOT LEFT G/E 3 VIEWS  LOCATION: [facility]  DATE: 6/17/2024    INDICATION: worsening diabetic ulcer, bone exposed  COMPARISON: Foot radiographs 10/19/2023 and 9/13/2023, MRI foot 10/16/2023      Impression    IMPRESSION: Prior amputation of the distal portion of the second metatarsal as well as prior amputation of the 1st digital at the metatarsal-phalangeal joint. Slight apparent worsening of lucent appearance of the 3rd metatarsal head, osteomyelitis not   entirely excluded. If clinically indicated consider further evaluation with MRI. Hammer toe deformities. Plantar surface calcaneal spur. Diffuse soft tissue swelling about the plantar soft tissues of the foot.    ?         Albert Maguire MD  Hospitalist  [facility]     Followed by PCP & Cardiology

## 2024-06-29 ENCOUNTER — NON-APPOINTMENT (OUTPATIENT)
Age: 74
End: 2024-06-29

## 2024-07-09 ENCOUNTER — APPOINTMENT (OUTPATIENT)
Dept: CT IMAGING | Facility: CLINIC | Age: 74
End: 2024-07-09
Payer: MEDICARE

## 2024-07-09 ENCOUNTER — OUTPATIENT (OUTPATIENT)
Dept: OUTPATIENT SERVICES | Facility: HOSPITAL | Age: 74
LOS: 1 days | End: 2024-07-09
Payer: MEDICARE

## 2024-07-09 DIAGNOSIS — R91.1 SOLITARY PULMONARY NODULE: ICD-10-CM

## 2024-07-09 DIAGNOSIS — Z90.49 ACQUIRED ABSENCE OF OTHER SPECIFIED PARTS OF DIGESTIVE TRACT: Chronic | ICD-10-CM

## 2024-07-09 DIAGNOSIS — Z98.890 OTHER SPECIFIED POSTPROCEDURAL STATES: Chronic | ICD-10-CM

## 2024-07-09 DIAGNOSIS — Z87.39 PERSONAL HISTORY OF OTHER DISEASES OF THE MUSCULOSKELETAL SYSTEM AND CONNECTIVE TISSUE: Chronic | ICD-10-CM

## 2024-07-09 DIAGNOSIS — Z96.659 PRESENCE OF UNSPECIFIED ARTIFICIAL KNEE JOINT: Chronic | ICD-10-CM

## 2024-07-09 PROCEDURE — 71250 CT THORAX DX C-: CPT

## 2024-07-09 PROCEDURE — 71250 CT THORAX DX C-: CPT | Mod: 26,MH

## 2024-07-17 ENCOUNTER — APPOINTMENT (OUTPATIENT)
Dept: THORACIC SURGERY | Facility: CLINIC | Age: 74
End: 2024-07-17
Payer: MEDICARE

## 2024-07-17 ENCOUNTER — NON-APPOINTMENT (OUTPATIENT)
Age: 74
End: 2024-07-17

## 2024-07-17 DIAGNOSIS — R91.1 SOLITARY PULMONARY NODULE: ICD-10-CM

## 2024-07-17 DIAGNOSIS — C34.90 MALIGNANT NEOPLASM OF UNSPECIFIED PART OF UNSPECIFIED BRONCHUS OR LUNG: ICD-10-CM

## 2024-07-17 PROCEDURE — 99442: CPT | Mod: 93

## 2024-11-20 NOTE — DISCHARGE NOTE PROVIDER - [id number]_NEU_ALL_CORE
85-year-old female with lung cancer being treated for acute on chronic respiratory failure requiring high-flow nasal cannula oxygen.  Unfortunately her condition is not improving and she appears to be dying.  Patient and family have elected for hospice care.    I am assisting with symptom management.   No

## 2024-12-19 NOTE — PROVIDER CONTACT NOTE (OTHER) - REASON
Imani Shankar  1947    Patient Care Team:  Chang Carter MD as PCP - General (Family Medicine)  Ana Meade MD as Gynecologist (Gynecology)  Jerry Cleveland Jr., MD as Consulting Physician (Hematology and Oncology)  Donnell Felder MD as Referring Physician (Otolaryngology)    Date of Admit: 12/17/2024    Date of Discharge:  12/19/2024    Discharge Diagnosis:  Spinal stenosis of lumbar region    Spinal stenosis of lumbar region with neurogenic claudication    Herniation of lumbar intervertebral disc with radiculopathy    Degeneration of intervertebral disc of lumbar region with discogenic back pain and lower extremity pain    Calf muscle weakness      Procedures Performed  Procedure(s):  Open bilateral lumbar decompression/discectomy lumbar four/five, lumbar five/sacral one       Complications: none    Consultants:   Consults       Date and Time Order Name Status Description    12/3/2024 11:08 PM Inpatient Neurosurgery Consult Completed     12/3/2024  9:19 PM Neurosurgery (on-call MD unless specified) Completed             Condition on Discharge: stable    Discharge disposition: home    Pertinent Test Results:  none    Brief HPI: Patient evaluated in office for complaints of back in bilateral posterior leg pain with right gastroc weakness. Imaging revealed lumbar MRI done on 12//24 was reviewed.  There is significant spinal stenosis at L2-L3 with a spondylolisthesis,   Moderate degree of stenosis at L3-L4, severe stenosis at L4-L5 with a superimposed disc protrusion mostly on the right, and severe stenosis at L5-S1 with superimposed disc protrusions bilaterally.  No instability on flexion-extension x-rays. . RBAs of treatment were discussed including the above procedure. Patient consented to above procedure.    Hospital Course: Patient admitted for above procedure. The procedure itself was without complication. The patient was transferred to [address] [place] [address] following recovery.  She is overall  Dr Jazmin Addison (PCP) doing well.  Her pain is better controlled today, she is voiding, ambulating, tolerating diet.  MAI drain has diminished.  It will be removed prior to discharge.  She feels ready for discharge.  She will be discharged with muscle relaxant and Norco 7.5.  She was previously prescribed tramadol and Percocet by PCP.  I have advised her to not take these pain medications but instead take the Norco.    Discharge Physical Exam:    Temp:  [97.7 °F (36.5 °C)-99 °F (37.2 °C)] 97.7 °F (36.5 °C)  Heart Rate:  [] 96  Resp:  [16-18] 16  BP: (119-131)/(74-82) 130/82    Current labs:  Lab Results (last 24 hours)       Procedure Component Value Units Date/Time    CBC & Differential [[id number]]  (Abnormal) Collected: 12/19/24 0506    Specimen: Blood Updated: 12/19/24 0603    Narrative:      The following orders were created for panel order CBC & Differential.  Procedure                               Abnormality         Status                     ---------                               -----------         ------                     CBC Auto Differential[[id number]]        Abnormal            Final result                 Please view results for these tests on the individual orders.    CBC Auto Differential [[id number]]  (Abnormal) Collected: 12/19/24 0506    Specimen: Blood Updated: 12/19/24 0603     WBC 6.42 10*3/mm3      RBC 4.07 10*6/mm3      Hemoglobin 12.3 g/dL      Hematocrit 37.4 %      MCV 91.9 fL      MCH 30.2 pg      MCHC 32.9 g/dL      RDW 13.6 %      RDW-SD 46.1 fl      MPV 9.2 fL      Platelets 167 10*3/mm3      Neutrophil % 52.9 %      Lymphocyte % 32.2 %      Monocyte % 12.0 %      Eosinophil % 2.0 %      Basophil % 0.3 %      Immature Grans % 0.6 %      Neutrophils, Absolute 3.39 10*3/mm3      Lymphocytes, Absolute 2.07 10*3/mm3      Monocytes, Absolute 0.77 10*3/mm3      Eosinophils, Absolute 0.13 10*3/mm3      Basophils, Absolute 0.02 10*3/mm3      Immature Grans, Absolute 0.04 10*3/mm3      nRBC 0.0 /100 WBC                General Appearance No acute distress.  Sitting up in bed.  Eating lunch.   at bedside   Neurological Awake, Alert, and oriented x 3   Motor Strength full strength bilateral lower extremity, tone normal   Gait and station Ambulating per PT note 12/18-needing CGA to log roll to sit, stand, and walk 75 ft with a rolling walker,    Back Midline lumbar incision well-approximated with CDI dressing.  MAI to bulb suction with cherry serous output   Extremities Moves all extremities well, no edema, no cyanosis, no redness         Discharge Medications  ROBERT has been reviewed and narcotic consent is on file in the patient's chart.     Your medication list        START taking these medications        Instructions Last Dose Given Next Dose Due   acetaminophen 325 MG tablet  Commonly known as: TYLENOL      Take 2 tablets by mouth Every 4 (Four) Hours As Needed for Mild Pain.       HYDROcodone-acetaminophen 7.5-325 MG per tablet  Commonly known as: NORCO      Take 1 tablet by mouth Every 4 (Four) Hours As Needed for Moderate Pain.       polyethylene glycol 17 g packet  Commonly known as: MIRALAX      Take 17 g by mouth Daily As Needed (Use if senna-docusate is ineffective).       sennosides-docusate 8.6-50 MG per tablet  Commonly known as: PERICOLACE      Take 2 tablets by mouth 2 (Two) Times a Day As Needed for Constipation.       tiZANidine 4 MG tablet  Commonly known as: ZANAFLEX      Take 1 tablet by mouth Every 6 (Six) Hours As Needed for Muscle Spasms.              CHANGE how you take these medications        Instructions Last Dose Given Next Dose Due   aspirin 81 MG chewable tablet  Start taking on: December 21, 2024  What changed: These instructions start on December 21, 2024. If you are unsure what to do until then, ask your doctor or other care provider.      Chew 1 tablet Daily.       pravastatin 40 MG tablet  Commonly known as: PRAVACHOL  What changed: when to take this      TAKE 1 TABLET BY MOUTH  DAILY              CONTINUE taking these medications        Instructions Last Dose Given Next Dose Due   levothyroxine 100 MCG tablet  Commonly known as: SYNTHROID, LEVOTHROID      Take 1 tablet by mouth Daily.       loratadine 10 MG tablet  Commonly known as: CLARITIN      Take 1 tablet by mouth Daily As Needed.       metoprolol succinate XL 50 MG 24 hr tablet  Commonly known as: TOPROL-XL      Take 1 tablet by mouth Daily.       multivitamin with minerals tablet tablet      Take 1 tablet by mouth Daily. HOLDING FOR DOS       naloxone 4 MG/0.1ML nasal spray  Commonly known as: NARCAN      Call 911. Don't prime. [place] in 1 nostril for overdose. Repeat in 2-3 minutes in other nostril if no or minimal breathing/responsiveness.       omeprazole 40 MG capsule  Commonly known as: priLOSEC      Take 1 capsule by mouth Daily.              STOP taking these medications      oxyCODONE-acetaminophen 5-325 MG per tablet  Commonly known as: Percocet        predniSONE 10 MG tablet  Commonly known as: DELTASONE        traMADol 50 MG tablet  Commonly known as: ULTRAM                  Where to Get Your Medications        These medications were sent to [place] [address]      Hours: Monday to Friday 7 AM to 6 PM, Saturday & Sunday 8 AM to 4:30 PM (Closed 12 PM to 12:30 PM) Phone: [phone]   HYDROcodone-acetaminophen 7.5-325 MG per tablet  sennosides-docusate 8.6-50 MG per tablet  tiZANidine 4 MG tablet       You can get these medications from any pharmacy    You don't need a prescription for these medications  acetaminophen 325 MG tablet  polyethylene glycol 17 g packet       Information about where to get these medications is not yet available    Ask your nurse or doctor about these medications  aspirin 81 MG chewable tablet         Discharge Diet:   Diet Instructions       Diet: Regular/House Diet; Thin (IDDSI 0)      Discharge Diet: Regular/House Diet    Fluid  "Consistency: Thin (IDDSI 0)          Diet Order   Procedures    Diet: Regular/House; Fluid Consistency: Thin (IDDSI 0)       Activity at Discharge:   Activity Instructions       Discharge Activity      1) No driving until cleared by us and no longer taking narcotics.   2) Off work/school until cleared by us.  3) May shower but no submerging wound in tub, pool, etc.  4) Do not lift / push / pull more then 5 lbs.  5.)  No overhead activity/ No bending/twisting/impact activity    Pelvic Rest              Call for: questions or concerns    Follow-up Appointments  Future Appointments   Date Time Provider Department Center   1/9/2025  1:00 PM Elizabeth Jama APRN [facility]   1/22/2025  1:20 PM LAB CHAIR 4 [facility]  [facility]   1/22/2025  1:40 PM Jerry Cleveland Jr., MD [facility]      Follow-up Information       Chang Carter MD .    Specialty: Family Medicine  Contact information:  [address] [place] [address] [address]  [facility] [facility] [address] [phone]                           Additional Instructions for the Follow-ups that You Need to Schedule       Notify Physician or Go To The ED For the Following Conditions   As directed      Including but not limited to fever >100.5, chills, wound concerns (redness, swelling, drainage), new symptoms of numbness, tingling, weakness; new or uncontrolled pain despite using prescribed medications    Order Comments: Including but not limited to fever >100.5, chills, wound concerns (redness, swelling, drainage), new symptoms of numbness, tingling, weakness; new or uncontrolled pain despite using prescribed medications                 Test Results Pending at Discharge  [email or web address]   None    I discussed the discharge instructions with patient and family    Jena Gomez, ALICIA  12/19/24  12:23 EST    30 min spent in reviewing records, discussion and examination of the patient and discussion with other members of the patient's medical team.    \"Dictated " "utilizing Dragon dictation\".      " dependent (less than 25% patients effort)

## 2025-01-23 ENCOUNTER — NON-APPOINTMENT (OUTPATIENT)
Age: 75
End: 2025-01-23

## 2025-01-23 ENCOUNTER — APPOINTMENT (OUTPATIENT)
Dept: OPHTHALMOLOGY | Facility: CLINIC | Age: 75
End: 2025-01-23
Payer: MEDICARE

## 2025-01-23 PROCEDURE — 92134 CPTRZ OPH DX IMG PST SGM RTA: CPT

## 2025-01-23 PROCEDURE — 66821 AFTER CATARACT LASER SURGERY: CPT | Mod: RT

## 2025-01-23 PROCEDURE — 92004 COMPRE OPH EXAM NEW PT 1/>: CPT | Mod: 25

## 2025-02-06 ENCOUNTER — NON-APPOINTMENT (OUTPATIENT)
Age: 75
End: 2025-02-06

## 2025-02-06 ENCOUNTER — APPOINTMENT (OUTPATIENT)
Dept: OPHTHALMOLOGY | Facility: CLINIC | Age: 75
End: 2025-02-06
Payer: MEDICARE

## 2025-02-06 PROCEDURE — 66821 AFTER CATARACT LASER SURGERY: CPT | Mod: 79,LT

## 2025-04-03 ENCOUNTER — NON-APPOINTMENT (OUTPATIENT)
Age: 75
End: 2025-04-03

## 2025-04-03 ENCOUNTER — APPOINTMENT (OUTPATIENT)
Dept: OPHTHALMOLOGY | Facility: CLINIC | Age: 75
End: 2025-04-03
Payer: MEDICARE

## 2025-04-03 PROCEDURE — 99024 POSTOP FOLLOW-UP VISIT: CPT

## 2025-06-25 ENCOUNTER — NON-APPOINTMENT (OUTPATIENT)
Age: 75
End: 2025-06-25

## 2025-07-21 ENCOUNTER — APPOINTMENT (OUTPATIENT)
Dept: THORACIC SURGERY | Facility: CLINIC | Age: 75
End: 2025-07-21
Payer: MEDICARE

## 2025-07-21 DIAGNOSIS — C34.90 MALIGNANT NEOPLASM OF UNSPECIFIED PART OF UNSPECIFIED BRONCHUS OR LUNG: ICD-10-CM

## 2025-07-21 DIAGNOSIS — R91.1 SOLITARY PULMONARY NODULE: ICD-10-CM

## 2025-07-21 PROCEDURE — 99203 OFFICE O/P NEW LOW 30 MIN: CPT | Mod: 93
